# Patient Record
Sex: FEMALE | Employment: UNEMPLOYED | URBAN - METROPOLITAN AREA
[De-identification: names, ages, dates, MRNs, and addresses within clinical notes are randomized per-mention and may not be internally consistent; named-entity substitution may affect disease eponyms.]

---

## 2017-06-20 DIAGNOSIS — Z30.41 ENCOUNTER FOR SURVEILLANCE OF CONTRACEPTIVE PILLS: ICD-10-CM

## 2017-06-20 NOTE — TELEPHONE ENCOUNTER
levonorgestrel-ethinyl estradiol (NORDETTE) 0.15-30 MG-MCG       Last Written Prescription Date: 7/22/16  Last Fill Quantity: 90,  # refills: 3   Last Office Visit with G, P /or [facility] prescribing provider: 7/22/16

## 2017-06-21 RX ORDER — LEVONORGESTREL AND ETHINYL ESTRADIOL 0.15-0.03
KIT ORAL
Qty: 28 TABLET | Refills: 0 | Status: SHIPPED | OUTPATIENT
Start: 2017-06-21 | End: 2017-06-30

## 2017-06-21 NOTE — TELEPHONE ENCOUNTER
TC-  Please call pt to schedule yearly exam.  Pt overdue.    Medication is being filled for 1 time refill only due to:  Patient needs to be seen because it has been more than one year since last visit.     Jenae Stevens, RN  Triage Nurse

## 2017-06-22 NOTE — TELEPHONE ENCOUNTER
Patient given the message to schedule appointment, she will call back to schedule.  -Ana Cristina Cain

## 2017-06-30 ENCOUNTER — OFFICE VISIT (OUTPATIENT)
Dept: FAMILY MEDICINE | Facility: CLINIC | Age: 32
End: 2017-06-30
Payer: COMMERCIAL

## 2017-06-30 VITALS
OXYGEN SATURATION: 98 % | SYSTOLIC BLOOD PRESSURE: 118 MMHG | TEMPERATURE: 99 F | HEART RATE: 94 BPM | WEIGHT: 142 LBS | BODY MASS INDEX: 25.16 KG/M2 | HEIGHT: 63 IN | DIASTOLIC BLOOD PRESSURE: 78 MMHG

## 2017-06-30 DIAGNOSIS — Z30.41 ENCOUNTER FOR SURVEILLANCE OF CONTRACEPTIVE PILLS: ICD-10-CM

## 2017-06-30 DIAGNOSIS — Z00.00 ENCOUNTER FOR ROUTINE ADULT HEALTH EXAMINATION WITHOUT ABNORMAL FINDINGS: Primary | ICD-10-CM

## 2017-06-30 LAB
ALBUMIN UR-MCNC: NEGATIVE MG/DL
APPEARANCE UR: CLEAR
BILIRUB UR QL STRIP: NEGATIVE
COLOR UR AUTO: YELLOW
GLUCOSE UR STRIP-MCNC: NEGATIVE MG/DL
HGB UR QL STRIP: ABNORMAL
KETONES UR STRIP-MCNC: NEGATIVE MG/DL
LEUKOCYTE ESTERASE UR QL STRIP: NEGATIVE
NITRATE UR QL: NEGATIVE
NON-SQ EPI CELLS #/AREA URNS LPF: NORMAL /LPF
PH UR STRIP: 6 PH (ref 5–7)
RBC #/AREA URNS AUTO: NORMAL /HPF (ref 0–2)
SP GR UR STRIP: 1.01 (ref 1–1.03)
URN SPEC COLLECT METH UR: ABNORMAL
UROBILINOGEN UR STRIP-ACNC: 0.2 EU/DL (ref 0.2–1)
WBC #/AREA URNS AUTO: NORMAL /HPF (ref 0–2)

## 2017-06-30 PROCEDURE — 81001 URINALYSIS AUTO W/SCOPE: CPT | Performed by: PHYSICIAN ASSISTANT

## 2017-06-30 PROCEDURE — 99395 PREV VISIT EST AGE 18-39: CPT | Performed by: PHYSICIAN ASSISTANT

## 2017-06-30 RX ORDER — LEVONORGESTREL AND ETHINYL ESTRADIOL 0.15-0.03
1 KIT ORAL DAILY
Qty: 90 TABLET | Refills: 3 | Status: SHIPPED | OUTPATIENT
Start: 2017-06-30 | End: 2018-05-07

## 2017-06-30 NOTE — NURSING NOTE
"Chief Complaint   Patient presents with     Physical       Initial /78 (BP Location: Right arm, Patient Position: Right side, Cuff Size: Adult Regular)  Pulse 94  Temp 99  F (37.2  C) (Tympanic)  Ht 5' 2.5\" (1.588 m)  Wt 142 lb (64.4 kg)  LMP 05/06/2017 (Approximate)  SpO2 98%  BMI 25.56 kg/m2 Estimated body mass index is 25.56 kg/(m^2) as calculated from the following:    Height as of this encounter: 5' 2.5\" (1.588 m).    Weight as of this encounter: 142 lb (64.4 kg).  Medication Reconciliation: complete  "

## 2017-06-30 NOTE — PROGRESS NOTES
SUBJECTIVE:   CC: Keara Ayon is an 32 year old woman who presents for preventive health visit.     Physical   Annual:     Getting at least 3 servings of Calcium per day::  Yes    Bi-annual eye exam::  NO    Dental care twice a year::  Yes    Sleep apnea or symptoms of sleep apnea::  None    Diet::  Regular (no restrictions)    Taking medications regularly::  Yes    Medication side effects::  None    Additional concerns today::  YES (review new bc rx and multi vitamin )    Would like ok to take MTV  Also got generic of OCP, wonders if okay?      Today's PHQ-2 Score:   PHQ-2 ( 1999 Pfizer) 6/30/2017   Q1: Little interest or pleasure in doing things 0   Q2: Feeling down, depressed or hopeless 0   PHQ-2 Score 0   Q1: Little interest or pleasure in doing things Not at all   Q2: Feeling down, depressed or hopeless Not at all   PHQ-2 Score 0       Abuse: Current or Past(Physical, Sexual or Emotional)- No  Do you feel safe in your environment - Yes    Social History   Substance Use Topics     Smoking status: Never Smoker     Smokeless tobacco: Never Used     Alcohol use No     The patient does not drink >3 drinks per day nor >7 drinks per week.    Reviewed orders with patient.  Reviewed health maintenance and updated orders accordingly - Yes  BP Readings from Last 3 Encounters:   06/30/17 118/78   07/22/16 130/60   06/03/14 102/58    Wt Readings from Last 3 Encounters:   06/30/17 142 lb (64.4 kg)   07/22/16 144 lb 4 oz (65.4 kg)   06/03/14 140 lb (63.5 kg)                  Patient Active Problem List   Diagnosis     Acute reaction to stress     CARDIOVASCULAR SCREENING; LDL GOAL LESS THAN 160     Postpartum depression     H/O hyperthyroidism     Overweight     Past Surgical History:   Procedure Laterality Date     NO HISTORY OF SURGERY         Social History   Substance Use Topics     Smoking status: Never Smoker     Smokeless tobacco: Never Used     Alcohol use No     Family History   Problem Relation Age of Onset      DIABETES Mother      Cardiovascular Mother      unknown      Thyroid Disease Mother      DIABETES Father      Family History Negative Sister      Family History Negative Brother      Family History Negative Son      Family History Negative Son      Family History Negative Daughter      Eye Disorder Paternal Grandmother          Current Outpatient Prescriptions   Medication Sig Dispense Refill     levonorgestrel-ethinyl estradiol (LEVORA 0.15/30, 28,) 0.15-30 MG-MCG per tablet Take 1 tablet by mouth daily for 90 doses 90 tablet 3     cholecalciferol (VITAMIN D) 1000 UNIT tablet Take 1 tablet (1,000 Units) by mouth daily Unsure of dose 100 tablet 3     [DISCONTINUED] LEVORA 0.15/30, 28, 0.15-30 MG-MCG per tablet TAKE 1 TABLET BY MOUTH DAILY 28 tablet 0       Mammogram not appropriate for this patient based on age.    Pertinent mammograms are reviewed under the imaging tab.  History of abnormal Pap smear: NO - age 30-65 PAP every 5 years with negative HPV co-testing recommended    Reviewed and updated as needed this visit by clinical staff  Tobacco  Allergies  Med Hx  Surg Hx  Fam Hx  Soc Hx        Reviewed and updated as needed this visit by Provider        Past Medical History:   Diagnosis Date     H/O hyperthyroidism 4/16/2013    post partum       Past Surgical History:   Procedure Laterality Date     NO HISTORY OF SURGERY         ROS:  C: NEGATIVE for fever, chills, change in weight  I: NEGATIVE for worrisome rashes, moles or lesions  E: NEGATIVE for vision changes or irritation  ENT: NEGATIVE for ear, mouth and throat problems  R: NEGATIVE for significant cough or SOB  B: NEGATIVE for masses, tenderness or discharge  CV: NEGATIVE for chest pain, palpitations or peripheral edema  GI: NEGATIVE for nausea, abdominal pain, heartburn, or change in bowel habits  : NEGATIVE for unusual urinary or vaginal symptoms. Periods are regular.  M: NEGATIVE for significant arthralgias or myalgia  N: NEGATIVE for  "weakness, dizziness or paresthesias  P: NEGATIVE for changes in mood or affect     OBJECTIVE:   /78 (BP Location: Right arm, Patient Position: Right side, Cuff Size: Adult Regular)  Pulse 94  Temp 99  F (37.2  C) (Tympanic)  Ht 5' 2.5\" (1.588 m)  Wt 142 lb (64.4 kg)  LMP 05/06/2017 (Approximate)  SpO2 98%  BMI 25.56 kg/m2  EXAM:  GENERAL: healthy, alert and no distress  EYES: Eyes grossly normal to inspection, PERRL and conjunctivae and sclerae normal  HENT: ear canals and TM's normal, nose and mouth without ulcers or lesions  NECK: no adenopathy, no asymmetry, masses, or scars and thyroid normal to palpation  RESP: lungs clear to auscultation - no rales, rhonchi or wheezes  BREAST: normal without masses, tenderness or nipple discharge and no palpable axillary masses or adenopathy  CV: regular rate and rhythm, normal S1 S2, no S3 or S4, no murmur, click or rub, no peripheral edema and peripheral pulses strong  ABDOMEN: soft, nontender, no hepatosplenomegaly, no masses and bowel sounds normal  : deferred  MS: no gross musculoskeletal defects noted, no edema  SKIN: no suspicious lesions or rashes  NEURO: Normal strength and tone, mentation intact and speech normal  PSYCH: mentation appears normal, affect normal/bright    ASSESSMENT/PLAN:   1. Encounter for routine adult health examination without abnormal findings  Pleasant 33 y/o  Labs checked last year normal. Will check next year.  Offered Tdap, she declined.  - *UA reflex to Microscopic and Culture ([place] [facility] (except [place] and [place])  - Urine Microscopic    2. Encounter for surveillance of contraceptive pills  Chronic medication, tolerating well without s/e, refilled for 1 year.  - levonorgestrel-ethinyl estradiol (LEVORA 0.15/30, 28,) 0.15-30 MG-MCG per tablet; Take 1 tablet by mouth daily for 90 doses  Dispense: 90 tablet; Refill: 3    COUNSELING:  Reviewed preventive health counseling, as reflected in patient " "instructions         reports that she has never smoked. She has never used smokeless tobacco.    Estimated body mass index is 25.56 kg/(m^2) as calculated from the following:    Height as of this encounter: 5' 2.5\" (1.588 m).    Weight as of this encounter: 142 lb (64.4 kg).       Counseling Resources:  ATP IV Guidelines  Pooled Cohorts Equation Calculator  Breast Cancer Risk Calculator  FRAX Risk Assessment  ICSI Preventive Guidelines  Dietary Guidelines for Americans, 2010  USDA's MyPlate  ASA Prophylaxis  Lung CA Screening    Rosalina Ellis PA-C  [facility]  "

## 2017-06-30 NOTE — MR AVS SNAPSHOT
"              After Visit Summary   2017    Keara Ayon    MRN: [id number]           Patient Information     Date Of Birth          1985        Visit Information        Provider Department      2017 3:10 PM Rosalina Ellis PA-C [facility]        Today's Diagnoses     Encounter for routine adult health examination without abnormal findings    -  1    Encounter for surveillance of contraceptive pills           Follow-ups after your visit        Who to contact     If you have questions or need follow up information about today's clinic visit or your schedule please contact [facility] directly at [phone].  Normal or non-critical lab and imaging results will be communicated to you by MyChart, letter or phone within 4 business days after the clinic has received the results. If you do not hear from us within 7 days, please contact the clinic through KeyVivehart or phone. If you have a critical or abnormal lab result, we will notify you by phone as soon as possible.  Submit refill requests through [employer] or call your pharmacy and they will forward the refill request to us. Please allow 3 business days for your refill to be completed.          Additional Information About Your Visit        MyChart Information     [employer] lets you send messages to your doctor, view your test results, renew your prescriptions, schedule appointments and more. To sign up, go to [email or web address] . Click on \"Log in\" on the left side of the screen, which will take you to the Welcome page. Then click on \"Sign up Now\" on the right side of the page.     You will be asked to enter the access code listed below, as well as some personal information. Please follow the directions to create your username and password.     Your access code is: [email or web address]  Expires: 2017  3:25 PM     Your access code will  in 90 days. If you need help or a new code, please call your [place] " "clinic or [phone].        Care EveryWhere ID     This is your Care EveryWhere ID. This could be used by other organizations to access your [place] medical records  [id number]        Your Vitals Were     Pulse Temperature Height Last Period Pulse Oximetry BMI (Body Mass Index)    94 99  F (37.2  C) (Tympanic) 5' 2.5\" (1.588 m) 05/06/2017 (Approximate) 98% 25.56 kg/m2       Blood Pressure from Last 3 Encounters:   06/30/17 118/78   07/22/16 130/60   06/03/14 102/58    Weight from Last 3 Encounters:   06/30/17 142 lb (64.4 kg)   07/22/16 144 lb 4 oz (65.4 kg)   06/03/14 140 lb (63.5 kg)              We Performed the Following     *UA reflex to Microscopic and Culture ([place] and [facility] (except [place] and [place])     Urine Microscopic          Today's Medication Changes          These changes are accurate as of: 6/30/17  3:25 PM.  If you have any questions, ask your nurse or doctor.               These medicines have changed or have updated prescriptions.        Dose/Directions    levonorgestrel-ethinyl estradiol 0.15-30 MG-MCG per tablet   Commonly known as:  LEVORA 0.15/30 (28)   This may have changed:  See the new instructions.   Used for:  Encounter for surveillance of contraceptive pills   Changed by:  Rosalina Ellis PA-C        Dose:  1 tablet   Take 1 tablet by mouth daily for 90 doses   Quantity:  90 tablet   Refills:  3            Where to get your medicines      These medications were sent to [facility] [address] [facility] [address] [facility] AT [facility] [address] [facility]  [address] [facility] [address]     Phone:  [phone]     levonorgestrel-ethinyl estradiol 0.15-30 MG-MCG per tablet                Primary Care Provider Office Phone # Fax #    Blanca Lyn [phone]       [facility] [address]  [facility] [address]        Equal Access to Services     MAHESH PULLIAM AH: Sebastián Anthony, " katrin turner, solange kachristopher dowell, rocky jessin hayaan randyjenn norbetrroyce laRonaldaajesica ah. So [facility] [phone].    ATENCIÓN: Si williamla jessica, tiene a whalen disposición servicios gratuitos de asistencia lingüística. Anthony al [phone].    We comply with applicable federal civil rights laws and [place] laws. We do not discriminate on the basis of race, color, national origin, age, disability sex, sexual orientation or gender identity.            Thank you!     Thank you for choosing [facility]  for your care. Our goal is always to provide you with excellent care. Hearing back from our patients is one way we can continue to improve our services. Please take a few minutes to complete the written survey that you may receive in the mail after your visit with us. Thank you!             Your Updated Medication List - Protect others around you: Learn how to safely use, store and throw away your medicines at www.disposemymeds.org.          This list is accurate as of: 6/30/17  3:25 PM.  Always use your most recent med list.                   Brand Name Dispense Instructions for use Diagnosis    cholecalciferol 1000 UNIT tablet    vitamin D    100 tablet    Take 1 tablet (1,000 Units) by mouth daily Unsure of dose        levonorgestrel-ethinyl estradiol 0.15-30 MG-MCG per tablet    LEVORA 0.15/30 (28)    90 tablet    Take 1 tablet by mouth daily for 90 doses    Encounter for surveillance of contraceptive pills

## 2017-07-17 DIAGNOSIS — Z30.41 ENCOUNTER FOR SURVEILLANCE OF CONTRACEPTIVE PILLS: ICD-10-CM

## 2017-07-17 NOTE — TELEPHONE ENCOUNTER
levonorgestrel-ethinyl estradiol (LEVORA 0.15/30, 28,) 0.15-30 MG-MCG      Last Written Prescription Date: 6/30/17  Last Fill Quantity: 90,  # refills: 3   Last Office Visit with FMMALIA, P or [facility] prescribing provider: 6/30/17

## 2017-07-19 RX ORDER — LEVONORGESTREL AND ETHINYL ESTRADIOL 0.15-0.03
KIT ORAL
Qty: 28 TABLET | Refills: 0 | OUTPATIENT
Start: 2017-07-19

## 2018-05-07 ENCOUNTER — OFFICE VISIT (OUTPATIENT)
Dept: FAMILY MEDICINE | Facility: CLINIC | Age: 33
End: 2018-05-07
Payer: COMMERCIAL

## 2018-05-07 VITALS
BODY MASS INDEX: 25.48 KG/M2 | TEMPERATURE: 97.9 F | DIASTOLIC BLOOD PRESSURE: 74 MMHG | WEIGHT: 143.8 LBS | HEART RATE: 74 BPM | SYSTOLIC BLOOD PRESSURE: 120 MMHG | OXYGEN SATURATION: 100 % | HEIGHT: 63 IN

## 2018-05-07 DIAGNOSIS — Z13.6 CARDIOVASCULAR SCREENING; LDL GOAL LESS THAN 160: ICD-10-CM

## 2018-05-07 DIAGNOSIS — Z86.39 H/O HYPERTHYROIDISM: ICD-10-CM

## 2018-05-07 DIAGNOSIS — Z30.41 ENCOUNTER FOR SURVEILLANCE OF CONTRACEPTIVE PILLS: ICD-10-CM

## 2018-05-07 DIAGNOSIS — Z86.32 HISTORY OF GESTATIONAL DIABETES: Primary | ICD-10-CM

## 2018-05-07 DIAGNOSIS — Z83.3 FAMILY HISTORY OF DIABETES MELLITUS: ICD-10-CM

## 2018-05-07 DIAGNOSIS — H57.89 EYE LUMP: ICD-10-CM

## 2018-05-07 DIAGNOSIS — Z00.00 ENCOUNTER FOR ROUTINE ADULT HEALTH EXAMINATION WITHOUT ABNORMAL FINDINGS: ICD-10-CM

## 2018-05-07 PROCEDURE — 99395 PREV VISIT EST AGE 18-39: CPT | Performed by: FAMILY MEDICINE

## 2018-05-07 RX ORDER — LEVONORGESTREL AND ETHINYL ESTRADIOL 0.15-0.03
1 KIT ORAL DAILY
Qty: 90 TABLET | Refills: 3 | Status: SHIPPED | OUTPATIENT
Start: 2018-05-07 | End: 2020-07-21

## 2018-05-07 ASSESSMENT — ENCOUNTER SYMPTOMS
CONSTIPATION: 0
CHILLS: 0
DIZZINESS: 0
HEMATURIA: 0
FREQUENCY: 0
EYE PAIN: 0
DIARRHEA: 0
FEVER: 0
COUGH: 0
HEMATOCHEZIA: 0
NERVOUS/ANXIOUS: 0
ABDOMINAL PAIN: 0

## 2018-05-07 NOTE — MR AVS SNAPSHOT
After Visit Summary   5/7/2018    Keara Ayon    MRN: [id number]           Patient Information     Date Of Birth          1985        Visit Information        Provider Department      5/7/2018 10:50 AM [facility]        Today's Diagnoses     History of gestational diabetes    -  1    Family history of diabetes mellitus        Encounter for surveillance of contraceptive pills        CARDIOVASCULAR SCREENING; LDL GOAL LESS THAN 160          Care Instructions      Preventive Health Recommendations  Female Ages 26 - 39  Yearly exam:   See your health care provider every year in order to    Review health changes.     Discuss preventive care.      Review your medicines if you your doctor has prescribed any.    Until age 30: Get a Pap test every three years (more often if you have had an abnormal result).    After age 30: Talk to your doctor about whether you should have a Pap test every 3 years or have a Pap test with HPV screening every 5 years.   You do not need a Pap test if your uterus was removed (hysterectomy) and you have not had cancer.  You should be tested each year for STDs (sexually transmitted diseases), if you're at risk.   Talk to your provider about how often to have your cholesterol checked.  If you are at risk for diabetes, you should have a diabetes test (fasting glucose).  Shots: Get a flu shot each year. Get a tetanus shot every 10 years.   Nutrition:     Eat at least 5 servings of fruits and vegetables each day.    Eat whole-grain bread, whole-wheat pasta and brown rice instead of white grains and rice.    Talk to your provider about Calcium and Vitamin D.     Lifestyle    Exercise at least 150 minutes a week (30 minutes a day, 5 days of the week). This will help you control your weight and prevent disease.    Limit alcohol to one drink per day.    No smoking.     Wear sunscreen to prevent skin cancer.    See your dentist every six months for an  exam and cleaning.      --------------------------------------------    You can schedule a fasting lab visit at your convenience.              Follow-ups after your visit        Follow-up notes from your care team     Return in about 1 year (around 5/7/2019) for Physical Exam.      Future tests that were ordered for you today     Open Future Orders        Priority Expected Expires Ordered    Lipid panel reflex to direct LDL Fasting Routine 5/7/2018 5/7/2019 5/7/2018    **A1C FUTURE anytime Routine 5/7/2018 5/7/2019 5/7/2018    **Glucose FUTURE anytime Routine 5/7/2018 5/7/2019 5/7/2018            Who to contact     If you have questions or need follow up information about today's clinic visit or your schedule please contact [facility] directly at [phone].  Normal or non-critical lab and imaging results will be communicated to you by Edge[employer]hart, letter or phone within 4 business days after the clinic has received the results. If you do not hear from us within 7 days, please contact the clinic through [employer] or phone. If you have a critical or abnormal lab result, we will notify you by phone as soon as possible.  Submit refill requests through [employer] or call your pharmacy and they will forward the refill request to us. Please allow 3 business days for your refill to be completed.          Additional Information About Your Visit        Edgeiohart Information     [employer] gives you secure access to your electronic health record. If you see a primary care provider, you can also send messages to your care team and make appointments. If you have questions, please call your primary care clinic.  If you do not have a primary care provider, please call [phone] and they will assist you.        Care EveryWhere ID     This is your Care EveryWhere ID. This could be used by other organizations to access your [place] medical records  [id number]        Your Vitals Were     Pulse Temperature Height Pulse  "Oximetry BMI (Body Mass Index)       74 97.9  F (36.6  C) (Oral) 5' 2.5\" (1.588 m) 100% 25.88 kg/m2        Blood Pressure from Last 3 Encounters:   05/07/18 120/74   06/30/17 118/78   07/22/16 130/60    Weight from Last 3 Encounters:   05/07/18 143 lb 12.8 oz (65.2 kg)   06/30/17 142 lb (64.4 kg)   07/22/16 144 lb 4 oz (65.4 kg)                 Where to get your medicines      These medications were sent to [facility] [address] [place], [place] - [address] [facility] [place] [address] & [facility]  [address] [facility] [address]     Phone:  [phone]     levonorgestrel-ethinyl estradiol 0.15-30 MG-MCG per tablet          Primary Care Provider Office Phone # Fax #    Blanca Lyn [phone] [address]  [facility] [address]        Equal Access to Services     [facility]: Hadii khushbu ku hadasho Sochanelali, waaxda luqadaha, qaybta kaalmada magalys, rocky herron. So [facility] [phone].    ATENCIÓN: Si habla español, tiene a whalen disposición servicios gratuitos de asistencia lingüística. [facility] [phone].    We comply with applicable federal civil rights laws and [place] laws. We do not discriminate on the basis of race, color, national origin, age, disability, sex, sexual orientation, or gender identity.            Thank you!     Thank you for choosing [facility]  for your care. Our goal is always to provide you with excellent care. Hearing back from our patients is one way we can continue to improve our services. Please take a few minutes to complete the written survey that you may receive in the mail after your visit with us. Thank you!             Your Updated Medication List - Protect others around you: Learn how to safely use, store and throw away your medicines at www.disposemymeds.org.          This list is accurate as of 5/7/18 11:24 AM.  Always use your most recent med list.                   Brand Name " Dispense Instructions for use Diagnosis    cholecalciferol 1000 UNIT tablet    vitamin D3    100 tablet    Take 1 tablet (1,000 Units) by mouth daily Unsure of dose        levonorgestrel-ethinyl estradiol 0.15-30 MG-MCG per tablet    LEVORA 0.15/30 (28)    90 tablet    Take 1 tablet by mouth daily    Encounter for surveillance of contraceptive pills

## 2018-05-07 NOTE — PATIENT INSTRUCTIONS

## 2018-05-07 NOTE — PROGRESS NOTES
SUBJECTIVE:   CC: Keara Ayon is an 33 year old woman who presents for preventive health visit.     Physical   Annual:     Getting at least 3 servings of Calcium per day::  NO    Bi-annual eye exam::  NO    Dental care twice a year::  Yes    Sleep apnea or symptoms of sleep apnea::  None    Diet::  Regular (no restrictions)    Taking medications regularly::  Yes    Medication side effects::  None    Additional concerns today::  No                    Today's PHQ-2 Score:   PHQ-2 ( 1999 Pfizer) 6/30/2017   Q1: Little interest or pleasure in doing things 0   Q2: Feeling down, depressed or hopeless 0   PHQ-2 Score 0   Q1: Little interest or pleasure in doing things Not at all   Q2: Feeling down, depressed or hopeless Not at all   PHQ-2 Score 0       Abuse: Current or Past(Physical, Sexual or Emotional)- No  Do you feel safe in your environment - Yes    Social History   Substance Use Topics     Smoking status: Never Smoker     Smokeless tobacco: Never Used     Alcohol use No     No flowsheet data found.    Reviewed orders with patient.  Reviewed health maintenance and updated orders accordingly - Yes      Mammogram not appropriate for this patient based on age.    Pertinent mammograms are reviewed under the imaging tab.  History of abnormal Pap smear: NO - age 30- 65 PAP every 3 years recommended  NO - age 30-65 PAP every 5 years with negative HPV co-testing recommended    Reviewed and updated as needed this visit by clinical staff         Reviewed and updated as needed this visit by Provider        Patient Active Problem List   Diagnosis     Acute reaction to stress     CARDIOVASCULAR SCREENING; LDL GOAL LESS THAN 160     Postpartum depression     H/O hyperthyroidism     Overweight     History of gestational diabetes       Past Medical History:   Diagnosis Date     H/O hyperthyroidism 4/16/2013    post partum        Past Surgical History:   Procedure Laterality Date     NO HISTORY OF SURGERY         Obstetric  History       T3      L3     SAB0   TAB0   Ectopic0   Multiple0   Live Births0       # Outcome Date GA Lbr Amos/2nd Weight Sex Delivery Anes PTL Lv   3 Term            2 Term            1 Term                   Current Outpatient Prescriptions   Medication Sig Dispense Refill     cholecalciferol (VITAMIN D) 1000 UNIT tablet Take 1 tablet (1,000 Units) by mouth daily Unsure of dose 100 tablet 3     levonorgestrel-ethinyl estradiol (LEVORA 0.15/30, 28,) 0.15-30 MG-MCG per tablet Take 1 tablet by mouth daily 90 tablet 3     [DISCONTINUED] levonorgestrel-ethinyl estradiol (LEVORA 0.15/30, 28,) 0.15-30 MG-MCG per tablet Take 1 tablet by mouth daily for 90 doses 90 tablet 3       Family History   Problem Relation Age of Onset     DIABETES Mother      Cardiovascular Mother      unknown      Thyroid Disease Mother      DIABETES Father      Family History Negative Sister      Family History Negative Brother      Family History Negative Son      Family History Negative Son      Family History Negative Daughter      Eye Disorder Paternal Grandmother        Social History   Substance Use Topics     Smoking status: Never Smoker     Smokeless tobacco: Never Used     Alcohol use No       Immunization History   Administered Date(s) Administered     Influenza (IIV3) PF 2006     TD (ADULT, 7+) 2004       Review of Systems   Constitutional: Negative for chills and fever.   HENT: Negative for congestion and ear pain.    Eyes: Negative for pain.   Respiratory: Negative for cough.    Cardiovascular: Negative for chest pain.   Gastrointestinal: Negative for abdominal pain, constipation, diarrhea and hematochezia.   Genitourinary: Negative for frequency, genital sores and hematuria.   Neurological: Negative for dizziness.   Psychiatric/Behavioral: The patient is not nervous/anxious.      Active in the home.    Small white ball in eyes. Left > right. In the last month. Not bothersome.          OBJECTIVE:   /74  "(BP Location: Right arm, Cuff Size: Adult Regular)  Pulse 74  Temp 97.9  F (36.6  C) (Oral)  Ht 5' 2.5\" (1.588 m)  Wt 143 lb 12.8 oz (65.2 kg)  SpO2 100%  BMI 25.88 kg/m2  Physical Exam  GENERAL: healthy, alert and no distress  EYES: Eyes grossly normal to inspection, PERRL and conjunctivae and sclerae normal x there is a small white lump, ~ 1 mm diameter, though left was more prominent, outer corner lower lids bilaterally, conjunctival surface.  HENT: ear canals and TM's normal, nose and mouth without ulcers or lesions  NECK: no adenopathy, no asymmetry, masses, or scars and thyroid normal to palpation  RESP: lungs clear to auscultation - no rales, rhonchi or wheezes  BREAST: normal without masses, tenderness or nipple discharge and no palpable axillary masses or adenopathy  CV: regular rates and rhythm, peripheral pulses strong and no peripheral edema  ABDOMEN: soft, nontender, no hepatosplenomegaly, no masses and bowel sounds normal  MS: no gross musculoskeletal defects noted, no edema  SKIN: no suspicious lesions or rashes  NEURO: Normal strength and tone, mentation intact and speech normal  PSYCH: mentation appears normal, affect normal/bright    TSH   Date Value Ref Range Status   07/25/2016 1.26 0.40 - 4.00 mU/L Final   ]    Lab Results   Component Value Date    A1C 5.7 10/23/2013       Recent Labs   Lab Test  07/25/16   0911  06/03/14   1452  07/08/11   0950   CHOL  149  146  128   HDL  49*  42*  37*   LDL  84  78  79   TRIG  81  133  61   CHOLHDLRATIO   --   3.5  3.5       ASSESSMENT/PLAN:     Encounter for routine adult health examination without abnormal findings  defers immunization, including Tdap.  Defers HIV screen.    Eye lump  Suspect a clogged gland of some sort.  Discussed heat.   If bothersome, do recommend eye doctor.     History of gestational diabetes  Discussed she is at higher risk for DM; reviewed her last HgbA1C that did confirm prediabetes.   She anticipates returning in the future. " "  - **A1C FUTURE anytime; Future  - **Glucose FUTURE anytime; Future    Family history of diabetes mellitus  As above.   - **A1C FUTURE anytime; Future  - **Glucose FUTURE anytime; Future    Encounter for surveillance of contraceptive pills  Refilling.   - levonorgestrel-ethinyl estradiol (LEVORA 0.15/30, 28,) 0.15-30 MG-MCG per tablet; Take 1 tablet by mouth daily    H/O hyperthyroidism  Discussed consideration of TSH testing; she defers.  Did discuss symptoms of both hyper and hypo thyroidism; recommend low threshhold for testing.     CARDIOVASCULAR SCREENING; LDL GOAL LESS THAN 160  She will return when fasting.   - Lipid panel reflex to direct LDL Fasting; Future      COUNSELING:  Reviewed preventive health counseling, as reflected in patient instructions       Regular exercise       Healthy diet/nutrition       Vision screening    BP Screening:   Last 3 BP Readings:    BP Readings from Last 3 Encounters:   05/07/18 120/74   06/30/17 118/78   07/22/16 130/60       The following was recommended to the patient:  Re-screen BP within a year and recommended lifestyle modifications     reports that she has never smoked. She has never used smokeless tobacco.    Estimated body mass index is 25.88 kg/(m^2) as calculated from the following:    Height as of this encounter: 5' 2.5\" (1.588 m).    Weight as of this encounter: 143 lb 12.8 oz (65.2 kg).   Weight management plan: Discussed healthy diet and exercise guidelines and patient will follow up in 12 months in clinic to re-evaluate.    Counseling Resources:  ATP IV Guidelines  Pooled Cohorts Equation Calculator  Breast Cancer Risk Calculator  FRAX Risk Assessment  ICSI Preventive Guidelines  Dietary Guidelines for Americans, 2010  USDA's MyPlate  ASA Prophylaxis  Lung CA Screening    Blanca Lyn MD, MD  [facility], [place]  Answers for HPI/ROS submitted by the patient on 5/7/2018   PHQ-2 Score: 0    "

## 2018-06-22 DIAGNOSIS — Z30.41 ENCOUNTER FOR SURVEILLANCE OF CONTRACEPTIVE PILLS: ICD-10-CM

## 2018-06-22 RX ORDER — LEVONORGESTREL AND ETHINYL ESTRADIOL 0.15-0.03
KIT ORAL
Qty: 84 TABLET | Refills: 0 | OUTPATIENT
Start: 2018-06-22

## 2018-06-22 NOTE — TELEPHONE ENCOUNTER
"Duplicate?    Requested Prescriptions   Pending Prescriptions Disp Refills     ALTAVERA 0.15-30 MG-MCG per tablet [Pharmacy Med Name: ALTAVERA TABLETS 28S]  Last Written Prescription Date:  5/7/18  Last Fill Quantity: 90,  # refills: 3   Last office visit: 5/7/2018 with prescribing provider:  5/7/2018     Future Office Visit:     84 tablet 0     Sig: TAKE 1 TABLET BY MOUTH DAILY FOR 90 DOSES    Contraceptives Protocol Passed    6/22/2018  3:34 AM       Passed - Patient is not a current smoker if age is 35 or older       Passed - Recent (12 mo) or future (30 days) visit within the authorizing provider's specialty    Patient had office visit in the last 12 months or has a visit in the next 30 days with authorizing provider or within the authorizing provider's specialty.  See \"Patient Info\" tab in inbasket, or \"Choose Columns\" in Meds & Orders section of the refill encounter.           Passed - No active pregnancy on record       Passed - No positive pregnancy test in past 12 months          "

## 2019-11-07 ENCOUNTER — HEALTH MAINTENANCE LETTER (OUTPATIENT)
Age: 34
End: 2019-11-07

## 2020-06-21 DIAGNOSIS — Z30.41 ENCOUNTER FOR SURVEILLANCE OF CONTRACEPTIVE PILLS: ICD-10-CM

## 2020-06-23 NOTE — TELEPHONE ENCOUNTER
levonorgestrel-ethinyl estradiol (LEVORA 0.15/30, 28,) 0.15-30 MG-MCG per tablet   Last Written Prescription Date:  5/7/18  Last Fill Quantity: 90,   # refills: 3  Last Office Visit: 5/7/18  Future Office visit:    Next 5 appointments (look out 90 days)    Aug 06, 2020 10:10 AM CDT  PHYSICAL with lBanca Lyn MD  [facility] ([address] [address] [phone]           Routing refill request to provider for review/approval because:  Patient needs appointment to fill has been 2 years since last appt.       AALIYAH-     Imelda Gonzales RN on 6/23/2020 at 8:36 AM

## 2020-06-29 RX ORDER — LEVONORGESTREL AND ETHINYL ESTRADIOL 0.15-0.03
KIT ORAL
Qty: 84 TABLET | OUTPATIENT
Start: 2020-06-29

## 2020-06-29 RX ORDER — LEVONORGESTREL AND ETHINYL ESTRADIOL 0.1-0.02MG
KIT ORAL
Qty: 84 TABLET | Refills: 1 | Status: SHIPPED | OUTPATIENT
Start: 2020-06-29 | End: 2020-07-21 | Stop reason: ALTCHOICE

## 2020-06-29 NOTE — TELEPHONE ENCOUNTER
Routing refill request to provider for review/approval because:  Been > 2 years since visit.   Next 5 appointments (look out 90 days)    Aug 06, 2020 10:10 AM CDT  PHYSICAL with Blanca Lyn MD  [facility] [address] [place] [id number] [phone]        Vero VENEGAS RN

## 2020-06-29 NOTE — TELEPHONE ENCOUNTER
Please clarify which she is taking.  Then send appropriate ocp pill x3 months so that she has enough to get to her physical with  at the beginning of August.  CASTRO.

## 2020-06-29 NOTE — TELEPHONE ENCOUNTER
Called patient and informed patient that she would need an appointment for further refills since it has been 2 years. Patient stated she will call her  and call us back.     Imelda Gonzales RN on 6/29/2020 at 3:04 PM

## 2020-06-29 NOTE — TELEPHONE ENCOUNTER
Patient calling back to check the status, states she only has a couple of days left. Would like a call at [phone] when it has been sent to the pharmacy.    Deborah Kidd,

## 2020-07-21 ENCOUNTER — OFFICE VISIT (OUTPATIENT)
Dept: FAMILY MEDICINE | Facility: CLINIC | Age: 35
End: 2020-07-21
Payer: COMMERCIAL

## 2020-07-21 VITALS
DIASTOLIC BLOOD PRESSURE: 80 MMHG | WEIGHT: 149.6 LBS | OXYGEN SATURATION: 99 % | TEMPERATURE: 99.1 F | HEIGHT: 63 IN | HEART RATE: 94 BPM | SYSTOLIC BLOOD PRESSURE: 110 MMHG | BODY MASS INDEX: 26.51 KG/M2

## 2020-07-21 DIAGNOSIS — Z30.41 ENCOUNTER FOR SURVEILLANCE OF CONTRACEPTIVE PILLS: ICD-10-CM

## 2020-07-21 DIAGNOSIS — Z00.00 ROUTINE HISTORY AND PHYSICAL EXAMINATION OF ADULT: Primary | ICD-10-CM

## 2020-07-21 DIAGNOSIS — N64.4 BREAST PAIN, RIGHT: ICD-10-CM

## 2020-07-21 DIAGNOSIS — L30.9 DERMATITIS: ICD-10-CM

## 2020-07-21 PROCEDURE — 99213 OFFICE O/P EST LOW 20 MIN: CPT | Mod: 25 | Performed by: FAMILY MEDICINE

## 2020-07-21 PROCEDURE — 99395 PREV VISIT EST AGE 18-39: CPT | Performed by: FAMILY MEDICINE

## 2020-07-21 RX ORDER — LEVONORGESTREL AND ETHINYL ESTRADIOL 0.15-0.03
1 KIT ORAL DAILY
Qty: 84 TABLET | Refills: 3 | Status: SHIPPED | OUTPATIENT
Start: 2020-07-21 | End: 2021-08-17

## 2020-07-21 RX ORDER — MULTIVITAMIN,THERAPEUTIC
1 TABLET ORAL DAILY
COMMUNITY

## 2020-07-21 ASSESSMENT — ENCOUNTER SYMPTOMS
FEVER: 0
FREQUENCY: 0
NERVOUS/ANXIOUS: 0
SORE THROAT: 0
NAUSEA: 0
DYSURIA: 0
MYALGIAS: 0
JOINT SWELLING: 0
SHORTNESS OF BREATH: 0
HEARTBURN: 0
HEADACHES: 0
COUGH: 0
PALPITATIONS: 0
HEMATURIA: 0
EYE PAIN: 0
CHILLS: 0
DIZZINESS: 0
BREAST MASS: 0
CONSTIPATION: 0
ARTHRALGIAS: 0
ABDOMINAL PAIN: 0
HEMATOCHEZIA: 0
PARESTHESIAS: 0
DIARRHEA: 0

## 2020-07-21 ASSESSMENT — MIFFLIN-ST. JEOR: SCORE: 1327.58

## 2020-07-21 NOTE — PROGRESS NOTES
SUBJECTIVE:   CC: Keara Ayon is an 35 year old woman who presents for preventive health visit.     Here for physical, needs OCP refilled.  Wants to go back to the one she was using before her current medication.  States that she has almost no period on this current one, and would like to go back to how she was before.    In the last 6 months, feels some pain over right breast.  Has some pain when kids lay on chest or when she pushed on it, no pain at rest.    Occasionally has rash on arms, which seems to be related to going out in the sun.  Has not used sunscreen, somewhat itchy, has not tried hydrocortisone cream.    Wonders if she needs to take a Vitamin D supplement.  Has no history of deficiency, has no signs or symptoms of fatigue or deficiency.    Healthy Habits:     Getting at least 3 servings of Calcium per day:  Yes    Bi-annual eye exam:  NO    Dental care twice a year:  Yes    Sleep apnea or symptoms of sleep apnea:  None    Diet:  Regular (no restrictions)    Frequency of exercise:  1 day/week    Duration of exercise:  Less than 15 minutes    Taking medications regularly:  Yes    Medication side effects:  None    PHQ-2 Total Score: 0    Additional concerns today:  No        Today's PHQ-2 Score:   PHQ-2 ( 1999 Pfizer) 7/21/2020   Q1: Little interest or pleasure in doing things 0   Q2: Feeling down, depressed or hopeless 0   PHQ-2 Score 0   Q1: Little interest or pleasure in doing things Not at all   Q2: Feeling down, depressed or hopeless Not at all   PHQ-2 Score 0       Abuse: Current or Past(Physical, Sexual or Emotional)- No  Do you feel safe in your environment? Yes        Social History     Tobacco Use     Smoking status: Never Smoker     Smokeless tobacco: Never Used   Substance Use Topics     Alcohol use: No     Alcohol/week: 0.0 standard drinks         Alcohol Use 7/21/2020   Prescreen: >3 drinks/day or >7 drinks/week? Not Applicable   Prescreen: >3 drinks/day or >7 drinks/week? -        Reviewed orders with patient.  Reviewed health maintenance and updated orders accordingly - Yes  Patient Active Problem List   Diagnosis     Acute reaction to stress     CARDIOVASCULAR SCREENING; LDL GOAL LESS THAN 160     Postpartum depression     H/O hyperthyroidism     Overweight     History of gestational diabetes     Past Surgical History:   Procedure Laterality Date     NO HISTORY OF SURGERY         Social History     Tobacco Use     Smoking status: Never Smoker     Smokeless tobacco: Never Used   Substance Use Topics     Alcohol use: No     Alcohol/week: 0.0 standard drinks     Family History   Problem Relation Age of Onset     Diabetes Mother      Cardiovascular Mother         unknown      Thyroid Disease Mother      Diabetes Father      Family History Negative Sister      Family History Negative Brother      Family History Negative Son      Family History Negative Son      Family History Negative Daughter      Eye Disorder Paternal Grandmother          Current Outpatient Medications   Medication Sig Dispense Refill     levonorgestrel-ethinyl estradiol (LEVORA 0.15/30, 28,) 0.15-30 MG-MCG tablet Take 1 tablet by mouth daily 84 tablet 3     multivitamin, therapeutic (THERA-VIT) TABS tablet Take 1 tablet by mouth daily       No Known Allergies    Mammogram Screening: Patient under age 50, mutual decision reflected in health maintenance.      Pertinent mammograms are reviewed under the imaging tab.  History of abnormal Pap smear: NO - age 30-65 PAP every 5 years with negative HPV co-testing recommended  PAP / HPV Latest Ref Rng & Units 7/22/2016 4/11/2013 3/29/2012   PAP - NIL NIL NIL   HPV 16 DNA NEG Negative - -   HPV 18 DNA NEG Negative - -   OTHER HR HPV NEG Negative - -     Reviewed and updated as needed this visit by clinical staff  Tobacco  Allergies  Meds  Med Hx  Surg Hx  Fam Hx  Soc Hx        Reviewed and updated as needed this visit by Provider  Tobacco  Meds  Med Hx  Surg Hx  Fam Hx  " Soc Hx           Review of Systems   Constitutional: Negative for chills and fever.   HENT: Negative for congestion, ear pain, hearing loss and sore throat.    Eyes: Negative for pain and visual disturbance.   Respiratory: Negative for cough and shortness of breath.    Cardiovascular: Negative for chest pain, palpitations and peripheral edema.   Gastrointestinal: Negative for abdominal pain, constipation, diarrhea, heartburn, hematochezia and nausea.   Breasts:  Negative for tenderness, breast mass and discharge.   Genitourinary: Negative for dysuria, frequency, genital sores, hematuria, urgency, vaginal bleeding and vaginal discharge.   Musculoskeletal: Negative for arthralgias, joint swelling and myalgias.   Skin: Negative for rash.   Neurological: Negative for dizziness, headaches and paresthesias.   Psychiatric/Behavioral: Negative for mood changes. The patient is not nervous/anxious.           OBJECTIVE:   /80 (BP Location: Right arm, Patient Position: Chair, Cuff Size: Adult Regular)   Pulse 94   Temp 99.1  F (37.3  C) (Oral)   Ht 1.592 m (5' 2.68\")   Wt 67.9 kg (149 lb 9.6 oz)   SpO2 99%   BMI 26.77 kg/m    Physical Exam  GENERAL: healthy, alert and no distress  EYES: Eyes grossly normal to inspection, PERRL and conjunctivae and sclerae normal  HENT: ear canals and TM's normal, nose and mouth without ulcers or lesions  NECK: no adenopathy, no asymmetry, masses, or scars and thyroid normal to palpation  RESP: lungs clear to auscultation - no rales, rhonchi or wheezes  BREAST: Right breast tender to palpation around the 2 o'clock position, ~3 cm from nipple.  Small palpable lump/tissue, slightly tender to palpation.  CV: regular rate and rhythm, normal S1 S2, no S3 or S4, no murmur, click or rub, no peripheral edema and peripheral pulses strong  ABDOMEN: soft, nontender, no hepatosplenomegaly, no masses and bowel sounds normal  MS: no gross musculoskeletal defects noted, no edema  SKIN: no " "suspicious lesions.  A few scattered erythematous raised patches on bilateral forearms.  NEURO: Normal strength and tone, mentation intact and speech normal  PSYCH: mentation appears normal, affect normal/bright    Diagnostic Test Results:  Labs reviewed in Epic    ASSESSMENT/PLAN:   1. Routine history and physical examination of adult  Reviewed history and updated health maintenance items as able.  Declined HIV screening.  Declined Tdap today, will defer to next year.      2. Encounter for surveillance of contraceptive pills  Discontinue current OCP, will switch to previous medication.  Follow up one year or sooner as needed.  - levonorgestrel-ethinyl estradiol (LEVORA 0.15/30, 28,) 0.15-30 MG-MCG tablet; Take 1 tablet by mouth daily  Dispense: 84 tablet; Refill: 3    3. Breast pain, right  Will order US to evaluate breast pain.  Follow up pending results.  - US Breast Bilateral Limited 1-3 Quadrants; Future    4.  Dermatitis  Recommended topical hydrocortisone cream for itching, recommended trial of sunscreen prior to sun exposure.  IF that does not work may try OTC antihistamine.  Follow up as needed.    COUNSELING:  Reviewed preventive health counseling, as reflected in patient instructions  Special attention given to:        Immunizations    Declined: TDAP due to Other                Contraception       HIV screeninx in teen years, 1x in adult years, and at intervals if high risk    Estimated body mass index is 26.77 kg/m  as calculated from the following:    Height as of this encounter: 1.592 m (5' 2.68\").    Weight as of this encounter: 67.9 kg (149 lb 9.6 oz).    Weight management plan: Discussed healthy diet and exercise guidelines     reports that she has never smoked. She has never used smokeless tobacco.      Counseling Resources:  ATP IV Guidelines  Pooled Cohorts Equation Calculator  Breast Cancer Risk Calculator  FRAX Risk Assessment  ICSI Preventive Guidelines  Dietary Guidelines for Americans, " 2010  USDA's MyPlate  ASA Prophylaxis  Lung CA Screening    April BALDEV Ring MD  [facility]

## 2020-07-21 NOTE — PATIENT INSTRUCTIONS
1.  Vitamin D3 1000 international unit(s) daily is ok to take.    2.  Topical hydrocortisone cream, 0.5 or 1% twice per day as needed for itching/rash on arms.    3.  Use sunscreen when outside, at least SPF 30.    4.  If rash persists despite sunscreen, may try Claritin, Zyrtec, or Allegra as needed.    5.  [facility] [phone],   Toll Free: [phone]

## 2021-07-08 ENCOUNTER — NURSE TRIAGE (OUTPATIENT)
Dept: FAMILY MEDICINE | Facility: CLINIC | Age: 36
End: 2021-07-08

## 2021-07-08 NOTE — TELEPHONE ENCOUNTER
S-(situation): Patient calling experiencing abnormal vaginal bleeding.     B-(background): Patient's period typically last 4 days and are regular. Patient's period started 6/30/21 and has continued to today. Patient is on birth control and is taking regularly. Patient also takes daily multivitamin. Patient reportedly not taking any blood thinners.     A-(assessment): Patient states on 5th or 6th day, period was fairly heavy, reported as moderate (1-2 pads/hour). Today and yesterday bleeding has been mild, 1 pad/4 hours. No abdominal pain. Patient states she does feel weak and more tired than usual. Patient is able to stand and walk around normally performing normal activities.     R-(recommendations): Per protocol, advised visit within 2 weeks in office. Offered to schedule appointment. Patient declines visit as feels insurance would not cover visit. Encouraged patient to rest and drink increased amount of fluids, change positions slowly. Encouraged patient to call back if symptoms worsen or change or has increase in dizziness/lightheadedness. Patient verbalized understanding and plans to call back to schedule appointment.     Reason for Disposition    Periods last > 7 days    Additional Information    Negative: SEVERE vaginal bleeding (e.g., continuous red blood from vagina, or large blood clots) and very weak (can't stand)    Negative: Passed out (i.e., fainted, collapsed and was not responding)    Negative: Difficult to awaken or acting confused (e.g., disoriented, slurred speech)    Negative: Shock suspected (e.g., cold/pale/clammy skin, too weak to stand, low BP, rapid pulse)    Negative: Sounds like a life-threatening emergency to the triager    Negative: Pregnant > 20 weeks (5 months or more)    Negative: Pregnant < 20 weeks (less than 5 months)    Negative: Postpartum (from 0 to 6 weeks after delivery)    Negative: Vaginal discharge is the main symptom and bleeding is slight    Negative: SEVERE abdominal  "pain (e.g., excruciating)    Negative: SEVERE dizziness (e.g., unable to stand, requires support to walk, feels like passing out now)    Negative: SEVERE vaginal bleeding (e.g., soaking 2 pads or tampons per hour and present 2 or more hours; 1 menstrual cup every 2 hours)    Negative: MODERATE vaginal bleeding (e.g., soaking pad or tampon per hour and present > 6 hours; 1 menstrual cup every 6 hours)    Negative: Pale skin (pallor) of new onset or worsening    Negative: Constant abdominal pain lasting > 2 hours    Negative: Patient sounds very sick or weak to the triager    Negative: Taking Coumadin (warfarin) or other strong blood thinner, or known bleeding disorder (e.g., thrombocytopenia)    Negative: Skin bruises or nosebleed and not caused by an injury    Negative: Bleeding/spotting after procedure (e.g., biopsy) or pelvic examination (e.g., pap smear) that persists > 3 days    Negative: Patient wants to be seen    Negative: Passed tissue (e.g., gray-white)    Negative: Periods with > 6 soaked pads or tampons per day    Answer Assessment - Initial Assessment Questions  1. AMOUNT: \"Describe the bleeding that you are having.\"     - SPOTTING: spotting, or pinkish / brownish mucous discharge; does not fill panti-liner or pad     - MILD:  less than 1 pad / hour; less than patient's usual menstrual bleeding    - MODERATE: 1-2 pads / hour; 1 menstrual cup every 6 hours; small-medium blood clots (e.g., pea, grape, small coin)    - SEVERE: soaking 2 or more pads/hour for 2 or more hours; 1 menstrual cup every 2 hours; bleeding not contained by pads or continuous red blood from vagina; large blood clots (e.g., golf ball, large coin)       Mild today and yesterday, on 5th and 6th day was moderate  2. ONSET: \"When did the bleeding begin?\" \"Is it continuing now?\"      Wednesday, continuing now  3. MENSTRUAL PERIOD: \"When was the last normal menstrual period?\" \"How is this different than your period?\"      Last month, " "normally lasts 4 days. This period is on 8th day.   4. REGULARITY: \"How regular are your periods?\"      Typically regular, this is first time it has gone this long   5. ABDOMINAL PAIN: \"Do you have any pain?\" \"How bad is the pain?\"  (e.g., Scale 1-10; mild, moderate, or severe)    - MILD (1-3): doesn't interfere with normal activities, abdomen soft and not tender to touch     - MODERATE (4-7): interferes with normal activities or awakens from sleep, tender to touch     - SEVERE (8-10): excruciating pain, doubled over, unable to do any normal activities       No pain  6. PREGNANCY: \"Could you be pregnant?\" \"Are you sexually active?\" \"Did you recently give birth?\"      Don't think i'm pregnant, on the pill, does not miss doses  7. BREASTFEEDING: \"Are you breastfeeding?\"      no  8. HORMONES: \"Are you taking any hormone medications, prescription or OTC?\" (e.g., birth control pills, estrogen)      Birth control pills   9. BLOOD THINNERS: \"Do you take any blood thinners?\" (e.g., Coumadin/warfarin, Pradaxa/dabigatran, aspirin)      no  10. CAUSE: \"What do you think is causing the bleeding?\" (e.g., recent gyn surgery, recent gyn procedure; known bleeding disorder, cervical cancer, polycystic ovarian disease, fibroids)          No idea  11. HEMODYNAMIC STATUS: \"Are you weak or feeling lightheaded?\" If so, ask: \"Can you stand and walk normally?\"         Feeling weak, little energy. Can stand and walk normally   12. OTHER SYMPTOMS: \"What other symptoms are you having with the bleeding?\" (e.g., passed tissue, vaginal discharge, fever, menstrual-type cramps)        no    Protocols used: VAGINAL BLEEDING - [id number]    Jimmie BUENO RN    "

## 2021-08-15 DIAGNOSIS — Z30.41 ENCOUNTER FOR SURVEILLANCE OF CONTRACEPTIVE PILLS: ICD-10-CM

## 2021-08-17 RX ORDER — LEVONORGESTREL AND ETHINYL ESTRADIOL 0.15-0.03
KIT ORAL
Qty: 84 TABLET | Refills: 0 | Status: SHIPPED | OUTPATIENT
Start: 2021-08-17 | End: 2021-09-01

## 2021-08-17 NOTE — TELEPHONE ENCOUNTER
3 month deena refill approved.     MA/TC: Please assist patient in scheduling office visit.     Angeles Erwin RN on 8/17/2021 at 10:56 AM

## 2021-09-01 ENCOUNTER — OFFICE VISIT (OUTPATIENT)
Dept: FAMILY MEDICINE | Facility: CLINIC | Age: 36
End: 2021-09-01
Payer: COMMERCIAL

## 2021-09-01 VITALS
DIASTOLIC BLOOD PRESSURE: 80 MMHG | HEIGHT: 63 IN | WEIGHT: 152 LBS | RESPIRATION RATE: 19 BRPM | HEART RATE: 96 BPM | TEMPERATURE: 99.8 F | BODY MASS INDEX: 26.93 KG/M2 | SYSTOLIC BLOOD PRESSURE: 112 MMHG | OXYGEN SATURATION: 99 %

## 2021-09-01 DIAGNOSIS — Z30.41 ENCOUNTER FOR SURVEILLANCE OF CONTRACEPTIVE PILLS: ICD-10-CM

## 2021-09-01 DIAGNOSIS — Z00.00 ROUTINE GENERAL MEDICAL EXAMINATION AT A HEALTH CARE FACILITY: Primary | ICD-10-CM

## 2021-09-01 DIAGNOSIS — N64.4 BREAST PAIN, RIGHT: ICD-10-CM

## 2021-09-01 LAB
ERYTHROCYTE [DISTWIDTH] IN BLOOD BY AUTOMATED COUNT: 13.2 % (ref 10–15)
HBA1C MFR BLD: 5.7 % (ref 0–5.6)
HCT VFR BLD AUTO: 39.9 % (ref 35–47)
HGB BLD-MCNC: 13.3 G/DL (ref 11.7–15.7)
MCH RBC QN AUTO: 29 PG (ref 26.5–33)
MCHC RBC AUTO-ENTMCNC: 33.3 G/DL (ref 31.5–36.5)
MCV RBC AUTO: 87 FL (ref 78–100)
PLATELET # BLD AUTO: 263 10E3/UL (ref 150–450)
RBC # BLD AUTO: 4.58 10E6/UL (ref 3.8–5.2)
WBC # BLD AUTO: 8.5 10E3/UL (ref 4–11)

## 2021-09-01 PROCEDURE — 83036 HEMOGLOBIN GLYCOSYLATED A1C: CPT | Performed by: FAMILY MEDICINE

## 2021-09-01 PROCEDURE — 99213 OFFICE O/P EST LOW 20 MIN: CPT | Mod: 25 | Performed by: FAMILY MEDICINE

## 2021-09-01 PROCEDURE — 36415 COLL VENOUS BLD VENIPUNCTURE: CPT | Performed by: FAMILY MEDICINE

## 2021-09-01 PROCEDURE — 85027 COMPLETE CBC AUTOMATED: CPT | Performed by: FAMILY MEDICINE

## 2021-09-01 PROCEDURE — 80053 COMPREHEN METABOLIC PANEL: CPT | Performed by: FAMILY MEDICINE

## 2021-09-01 PROCEDURE — 84443 ASSAY THYROID STIM HORMONE: CPT | Performed by: FAMILY MEDICINE

## 2021-09-01 PROCEDURE — 99395 PREV VISIT EST AGE 18-39: CPT | Performed by: FAMILY MEDICINE

## 2021-09-01 RX ORDER — LEVONORGESTREL AND ETHINYL ESTRADIOL 0.15-0.03
1 KIT ORAL DAILY
Qty: 84 TABLET | Refills: 3 | Status: SHIPPED | OUTPATIENT
Start: 2021-09-01 | End: 2022-06-20

## 2021-09-01 ASSESSMENT — ENCOUNTER SYMPTOMS
CHILLS: 0
CONSTIPATION: 0
DIARRHEA: 0
PARESTHESIAS: 0
FREQUENCY: 0
NERVOUS/ANXIOUS: 0
MYALGIAS: 0
HEADACHES: 0
EYE PAIN: 0
SHORTNESS OF BREATH: 0
ABDOMINAL PAIN: 0
SORE THROAT: 0
WEAKNESS: 0
NAUSEA: 0
FEVER: 0
DYSURIA: 0
DIZZINESS: 0
BREAST MASS: 0
PALPITATIONS: 0
HEMATOCHEZIA: 0
COUGH: 0
ARTHRALGIAS: 0
HEMATURIA: 0
JOINT SWELLING: 0
HEARTBURN: 0

## 2021-09-01 ASSESSMENT — MIFFLIN-ST. JEOR: SCORE: 1338.6

## 2021-09-01 NOTE — PROGRESS NOTES
SUBJECTIVE:   CC: Keara Ayon is an 36 year old woman who presents for preventive health visit.     Needs physical.    Needs Rx refilled for birth control.    Continues to have right sided breast pain, did not do the mammogram or US last year. Nothing new or different about pain. Still located on right breast, 3 o'clock position, medial to nipple.  Tenderness to palpation.    Of note:  In July, had very heavy bleeding, called nurses line, advised to monitor and follow up as needed.  Last two periods have been fine, no concerns.        Patient has been advised of split billing requirements and indicates understanding: Yes  Healthy Habits:     Getting at least 3 servings of Calcium per day:  Yes    Bi-annual eye exam:  NO    Dental care twice a year:  Yes    Sleep apnea or symptoms of sleep apnea:  None    Diet:  Regular (no restrictions)    Frequency of exercise:  2-3 days/week    Duration of exercise:  15-30 minutes    Taking medications regularly:  Not Applicable    Medication side effects:  None    PHQ-2 Total Score: 0    Additional concerns today:  No          Today's PHQ-2 Score:   PHQ-2 ( 1999 [employer]) 9/1/2021   Q1: Little interest or pleasure in doing things 0   Q2: Feeling down, depressed or hopeless 0   PHQ-2 Score 0   Q1: Little interest or pleasure in doing things Not at all   Q2: Feeling down, depressed or hopeless Not at all   PHQ-2 Score 0       Abuse: Current or Past (Physical, Sexual or Emotional) - No  Do you feel safe in your environment? Yes    Have you ever done Advance Care Planning? (For example, a Health Directive, POLST, or a discussion with a medical provider or your loved ones about your wishes): No, advance care planning information given to patient to review.  Patient plans to discuss their wishes with loved ones or provider.      Social History     Tobacco Use     Smoking status: Never Smoker     Smokeless tobacco: Never Used   Substance Use Topics     Alcohol use: No      Alcohol/week: 0.0 standard drinks         Alcohol Use 9/1/2021   Prescreen: >3 drinks/day or >7 drinks/week? No   Prescreen: >3 drinks/day or >7 drinks/week? -       Reviewed orders with patient.  Reviewed health maintenance and updated orders accordingly - Yes  Lab work is in process  Labs reviewed in EPIC  BP Readings from Last 3 Encounters:   09/01/21 112/80   07/21/20 110/80   05/07/18 120/74    Wt Readings from Last 3 Encounters:   09/01/21 68.9 kg (152 lb)   07/21/20 67.9 kg (149 lb 9.6 oz)   05/07/18 65.2 kg (143 lb 12.8 oz)                  Patient Active Problem List   Diagnosis     Acute reaction to stress     CARDIOVASCULAR SCREENING; LDL GOAL LESS THAN 160     Postpartum depression     H/O hyperthyroidism     Overweight     History of gestational diabetes     Past Surgical History:   Procedure Laterality Date     NO HISTORY OF SURGERY         Social History     Tobacco Use     Smoking status: Never Smoker     Smokeless tobacco: Never Used   Substance Use Topics     Alcohol use: No     Alcohol/week: 0.0 standard drinks     Family History   Problem Relation Age of Onset     Diabetes Mother      Cardiovascular Mother         unknown      Thyroid Disease Mother      Diabetes Father      Family History Negative Sister      Family History Negative Brother      Family History Negative Son      Family History Negative Son      Family History Negative Daughter      Eye Disorder Paternal Grandmother          Current Outpatient Medications   Medication Sig Dispense Refill     levonorgestrel-ethinyl estradiol (KURVELO) 0.15-30 MG-MCG tablet Take 1 tablet by mouth daily 84 tablet 3     multivitamin, therapeutic (THERA-VIT) TABS tablet Take 1 tablet by mouth daily       No Known Allergies  Recent Labs   Lab Test 09/01/21  1546 07/25/16  0911 06/03/14  1452 10/23/13  1600 10/23/13  1600   A1C 5.7*  --   --   --  5.7   LDL  --  84 78  --   --    HDL  --  49* 42*  --   --    TRIG  --  81 133  --   --    ALT 37 24  --    --   --    CR 0.73 0.69  --   --  0.71   GFRESTIMATED >90 >90  Non African American GFR Calc    --   --  >90   GFRESTBLACK  --  >90   GFR Calc    --   --  >90   POTASSIUM 3.6 4.2  --   --  4.0   TSH 1.23 1.26 1.04   < >  --     < > = values in this interval not displayed.        Breast Cancer Screening:    Breast CA Risk Assessment (FHS-7) 9/1/2021   Do you have a family history of breast, colon, or ovarian cancer? No / Unknown         Patient under 40 years of age: Routine Mammogram Screening not recommended.   Pertinent mammograms are reviewed under the imaging tab.    History of abnormal Pap smear: NO - age 30-65 PAP every 5 years with negative HPV co-testing recommended  PAP / HPV Latest Ref Rng & Units 7/22/2016 4/11/2013 3/29/2012   PAP (Historical) - NIL NIL NIL   HPV16 NEG Negative - -   HPV18 NEG Negative - -   HRHPV NEG Negative - -     Reviewed and updated as needed this visit by clinical staff  Tobacco  Allergies  Meds  Problems  Med Hx  Surg Hx  Fam Hx  Soc Hx          Reviewed and updated as needed this visit by Provider  Tobacco  Allergies  Meds  Problems  Med Hx  Surg Hx  Fam Hx         Past Medical History:   Diagnosis Date     H/O hyperthyroidism 4/16/2013    post partum       Past Surgical History:   Procedure Laterality Date     NO HISTORY OF SURGERY         Review of Systems   Constitutional: Negative for chills and fever.   HENT: Negative for congestion, ear pain, hearing loss and sore throat.    Eyes: Negative for pain and visual disturbance.   Respiratory: Negative for cough and shortness of breath.    Cardiovascular: Negative for chest pain, palpitations and peripheral edema.   Gastrointestinal: Negative for abdominal pain, constipation, diarrhea, heartburn, hematochezia and nausea.   Breasts:  Negative for tenderness, breast mass and discharge.   Genitourinary: Negative for dysuria, frequency, genital sores, hematuria, pelvic pain, urgency, vaginal bleeding and  "vaginal discharge.   Musculoskeletal: Negative for arthralgias, joint swelling and myalgias.   Skin: Negative for rash.   Neurological: Negative for dizziness, weakness, headaches and paresthesias.   Psychiatric/Behavioral: Negative for mood changes. The patient is not nervous/anxious.           OBJECTIVE:   /80 (BP Location: Right arm, Patient Position: Chair, Cuff Size: Adult Regular)   Pulse 96   Temp 99.8  F (37.7  C) (Oral)   Resp 19   Ht 1.6 m (5' 3\")   Wt 68.9 kg (152 lb)   LMP 08/25/2021   SpO2 99%   BMI 26.93 kg/m    Physical Exam  GENERAL: healthy, alert and no distress  EYES: Eyes grossly normal to inspection, PERRL and conjunctivae and sclerae normal  HENT: ear canals and TM's normal, nose and mouth without ulcers or lesions  NECK: no adenopathy, no asymmetry, masses, or scars and thyroid normal to palpation  RESP: lungs clear to auscultation - no rales, rhonchi or wheezes  BREAST: normal without masses, tenderness or nipple discharge and no palpable axillary masses or adenopathy  CV: regular rate and rhythm, normal S1 S2, no S3 or S4, no murmur, click or rub, no peripheral edema and peripheral pulses strong  ABDOMEN: soft, nontender, no hepatosplenomegaly, no masses and bowel sounds normal  MS: no gross musculoskeletal defects noted, no edema  SKIN: no suspicious lesions or rashes  NEURO: Normal strength and tone, mentation intact and speech normal  PSYCH: mentation appears normal, affect normal/bright    Diagnostic Test Results:  Labs reviewed in Epic    ASSESSMENT/PLAN:   (Z00.00) Routine general medical examination at a health care facility  (primary encounter diagnosis)  Comment: Patient declined Pap today.  Plan: CBC with platelets, Comprehensive metabolic         panel (BMP + Alb, Alk Phos, ALT, AST, Total.         Bili, TP), TSH with free T4 reflex, Hemoglobin         A1c        Exam completed today, routine health maintenance items updated as able.  Labs ordered.  Follow up one " "year or sooner as needed.    (Z30.41) Encounter for surveillance of contraceptive pills  Comment: Stable  Plan: levonorgestrel-ethinyl estradiol (KURVELO)         0.15-30 MG-MCG tablet        Refill OCP, follow up in one year.    (N64.4) Breast pain, right  Comment: Patient did not complete recommended imaging last year for same complaint.    Plan: Recommended Mammogram and Ultrasound to investigate, normal examination.  Suspect possible breast cyst.  Patient declined imaging today, will monitor.    Patient has been advised of split billing requirements and indicates understanding: Yes  COUNSELING:  Reviewed preventive health counseling, as reflected in patient instructions    Estimated body mass index is 26.93 kg/m  as calculated from the following:    Height as of this encounter: 1.6 m (5' 3\").    Weight as of this encounter: 68.9 kg (152 lb).    Weight management plan: Discussed healthy diet and exercise guidelines    She reports that she has never smoked. She has never used smokeless tobacco.      Counseling Resources:  ATP IV Guidelines  Pooled Cohorts Equation Calculator  Breast Cancer Risk Calculator  BRCA-Related Cancer Risk Assessment: FHS-7 Tool  FRAX Risk Assessment  ICSI Preventive Guidelines  Dietary Guidelines for Americans, 2010  USDA's MyPlate  ASA Prophylaxis  Lung CA Screening    April J. Charles Ring MD  [facility]"

## 2021-09-01 NOTE — PATIENT INSTRUCTIONS
Check with insurance regarding Pap Smear, Tetanus shot, and Hepatitis C screening test.    Preventive Health Recommendations  Female Ages 26 - 39  Yearly exam:   See your health care provider every year in order to    Review health changes.     Discuss preventive care.      Review your medicines if you your doctor has prescribed any.    Until age 30: Get a Pap test every three years (more often if you have had an abnormal result).    After age 30: Talk to your doctor about whether you should have a Pap test every 3 years or have a Pap test with HPV screening every 5 years.   You do not need a Pap test if your uterus was removed (hysterectomy) and you have not had cancer.  You should be tested each year for STDs (sexually transmitted diseases), if you're at risk.   Talk to your provider about how often to have your cholesterol checked.  If you are at risk for diabetes, you should have a diabetes test (fasting glucose).  Shots: Get a flu shot each year. Get a tetanus shot every 10 years.   Nutrition:     Eat at least 5 servings of fruits and vegetables each day.    Eat whole-grain bread, whole-wheat pasta and brown rice instead of white grains and rice.    Get adequate Calcium and Vitamin D.     Lifestyle    Exercise at least 150 minutes a week (30 minutes a day, 5 days of the week). This will help you control your weight and prevent disease.    Limit alcohol to one drink per day.    No smoking.     Wear sunscreen to prevent skin cancer.    See your dentist every six months for an exam and cleaning.

## 2021-09-03 LAB
ALBUMIN SERPL-MCNC: 3.6 G/DL (ref 3.4–5)
ALP SERPL-CCNC: 56 U/L (ref 40–150)
ALT SERPL W P-5'-P-CCNC: 37 U/L (ref 0–50)
ANION GAP SERPL CALCULATED.3IONS-SCNC: 7 MMOL/L (ref 3–14)
AST SERPL W P-5'-P-CCNC: 21 U/L (ref 0–45)
BILIRUB SERPL-MCNC: 0.2 MG/DL (ref 0.2–1.3)
BUN SERPL-MCNC: 8 MG/DL (ref 7–30)
CALCIUM SERPL-MCNC: 9 MG/DL (ref 8.5–10.1)
CHLORIDE BLD-SCNC: 107 MMOL/L (ref 94–109)
CO2 SERPL-SCNC: 24 MMOL/L (ref 20–32)
CREAT SERPL-MCNC: 0.73 MG/DL (ref 0.52–1.04)
GFR SERPL CREATININE-BSD FRML MDRD: >90 ML/MIN/1.73M2
GLUCOSE BLD-MCNC: 111 MG/DL (ref 70–99)
POTASSIUM BLD-SCNC: 3.6 MMOL/L (ref 3.4–5.3)
PROT SERPL-MCNC: 7.6 G/DL (ref 6.8–8.8)
SODIUM SERPL-SCNC: 138 MMOL/L (ref 133–144)
TSH SERPL DL<=0.005 MIU/L-ACNC: 1.23 MU/L (ref 0.4–4)

## 2021-09-25 ENCOUNTER — HEALTH MAINTENANCE LETTER (OUTPATIENT)
Age: 36
End: 2021-09-25

## 2021-11-07 DIAGNOSIS — Z30.41 ENCOUNTER FOR SURVEILLANCE OF CONTRACEPTIVE PILLS: ICD-10-CM

## 2021-11-08 RX ORDER — LEVONORGESTREL AND ETHINYL ESTRADIOL 0.15-0.03
KIT ORAL
Qty: 84 TABLET | Refills: 3 | OUTPATIENT
Start: 2021-11-08

## 2022-05-18 ENCOUNTER — TELEPHONE (OUTPATIENT)
Dept: FAMILY MEDICINE | Facility: CLINIC | Age: 37
End: 2022-05-18

## 2022-05-18 NOTE — TELEPHONE ENCOUNTER
Patient Quality Outreach    Patient is due for the following:   Cervical Cancer Screening - PAP Needed    NEXT STEPS:   Patient has upcoming appointment, these items will be addressed at that time.    Type of outreach:    Chart review performed, no outreach needed.      Questions for provider review:    None     Myke Melvin

## 2022-06-20 ENCOUNTER — OFFICE VISIT (OUTPATIENT)
Dept: FAMILY MEDICINE | Facility: CLINIC | Age: 37
End: 2022-06-20
Payer: COMMERCIAL

## 2022-06-20 VITALS
OXYGEN SATURATION: 99 % | HEART RATE: 85 BPM | HEIGHT: 63 IN | DIASTOLIC BLOOD PRESSURE: 76 MMHG | WEIGHT: 149 LBS | SYSTOLIC BLOOD PRESSURE: 117 MMHG | BODY MASS INDEX: 26.4 KG/M2 | TEMPERATURE: 98.2 F

## 2022-06-20 DIAGNOSIS — N64.4 BREAST PAIN, RIGHT: ICD-10-CM

## 2022-06-20 DIAGNOSIS — Z12.4 CERVICAL CANCER SCREENING: ICD-10-CM

## 2022-06-20 DIAGNOSIS — R73.03 PREDIABETES: ICD-10-CM

## 2022-06-20 DIAGNOSIS — Z86.39 H/O HYPERTHYROIDISM: ICD-10-CM

## 2022-06-20 DIAGNOSIS — Z00.00 ROUTINE GENERAL MEDICAL EXAMINATION AT A HEALTH CARE FACILITY: Primary | ICD-10-CM

## 2022-06-20 DIAGNOSIS — Z30.41 ENCOUNTER FOR SURVEILLANCE OF CONTRACEPTIVE PILLS: ICD-10-CM

## 2022-06-20 LAB — HBA1C MFR BLD: 5.8 % (ref 0–5.6)

## 2022-06-20 PROCEDURE — 36415 COLL VENOUS BLD VENIPUNCTURE: CPT | Performed by: FAMILY MEDICINE

## 2022-06-20 PROCEDURE — 99214 OFFICE O/P EST MOD 30 MIN: CPT | Mod: 25 | Performed by: FAMILY MEDICINE

## 2022-06-20 PROCEDURE — 99395 PREV VISIT EST AGE 18-39: CPT | Performed by: FAMILY MEDICINE

## 2022-06-20 PROCEDURE — 83036 HEMOGLOBIN GLYCOSYLATED A1C: CPT | Performed by: FAMILY MEDICINE

## 2022-06-20 PROCEDURE — 84443 ASSAY THYROID STIM HORMONE: CPT | Performed by: FAMILY MEDICINE

## 2022-06-20 RX ORDER — LEVONORGESTREL AND ETHINYL ESTRADIOL 0.15-0.03
1 KIT ORAL DAILY
Qty: 84 TABLET | Refills: 3 | Status: SHIPPED | OUTPATIENT
Start: 2022-06-20 | End: 2023-05-22

## 2022-06-20 ASSESSMENT — ENCOUNTER SYMPTOMS
SORE THROAT: 0
MYALGIAS: 0
COUGH: 0
SHORTNESS OF BREATH: 0
PALPITATIONS: 0
FEVER: 0
EYE PAIN: 0
HEMATURIA: 0
DYSURIA: 0
CONSTIPATION: 0
DIZZINESS: 0
HEMATOCHEZIA: 0
HEARTBURN: 0
FREQUENCY: 0
ABDOMINAL PAIN: 0
BREAST MASS: 1
DIARRHEA: 0
JOINT SWELLING: 0
HEADACHES: 0
NAUSEA: 0
PARESTHESIAS: 0
ARTHRALGIAS: 0
WEAKNESS: 0
NERVOUS/ANXIOUS: 0
CHILLS: 0

## 2022-06-20 NOTE — PATIENT INSTRUCTIONS
Call to schedule mammogram and ultrasound at [facility] [phone]      Preventive Health Recommendations  Female Ages 26 - 39  Yearly exam:   See your health care provider every year in order to  Review health changes.   Discuss preventive care.    Review your medicines if you your doctor has prescribed any.    Until age 30: Get a Pap test every three years (more often if you have had an abnormal result).    After age 30: Talk to your doctor about whether you should have a Pap test every 3 years or have a Pap test with HPV screening every 5 years.   You do not need a Pap test if your uterus was removed (hysterectomy) and you have not had cancer.  You should be tested each year for STDs (sexually transmitted diseases), if you're at risk.   Talk to your provider about how often to have your cholesterol checked.  If you are at risk for diabetes, you should have a diabetes test (fasting glucose).  Shots: Get a flu shot each year. Get a tetanus shot every 10 years.   Nutrition:   Eat at least 5 servings of fruits and vegetables each day.  Eat whole-grain bread, whole-wheat pasta and brown rice instead of white grains and rice.  Get adequate Calcium and Vitamin D.     Lifestyle  Exercise at least 150 minutes a week (30 minutes a day, 5 days of the week). This will help you control your weight and prevent disease.  Limit alcohol to one drink per day.  No smoking.   Wear sunscreen to prevent skin cancer.  See your dentist every six months for an exam and cleaning.

## 2022-06-20 NOTE — PROGRESS NOTES
SUBJECTIVE:   CC: Keara Ayon is an 37 year old woman who presents for preventive health visit.     Would like her physical today and birth control filled.    Does still have some right sided breast pain with pressure, nothing new or different.  Did not do the imaging that was recommended.  Breast pain does not have any particular pattern.      Healthy Habits:     Getting at least 3 servings of Calcium per day:  Yes    Bi-annual eye exam:  NO    Dental care twice a year:  Yes    Sleep apnea or symptoms of sleep apnea:  None    Diet:  Regular (no restrictions)    Frequency of exercise:  1 day/week    Duration of exercise:  15-30 minutes    Taking medications regularly:  Not Applicable    Medication side effects:  None    PHQ-2 Total Score: 0    Additional concerns today:  No      Patient doesn't want to get her pap today    Today's PHQ-2 Score:   PHQ-2 ( 1999 Pfizer) 6/20/2022   Q1: Little interest or pleasure in doing things 0   Q2: Feeling down, depressed or hopeless 0   PHQ-2 Score 0   PHQ-2 Total Score (12-17 Years)- Positive if 3 or more points; Administer PHQ-A if positive -   Q1: Little interest or pleasure in doing things Not at all   Q2: Feeling down, depressed or hopeless Not at all   PHQ-2 Score 0       Abuse: Current or Past (Physical, Sexual or Emotional) - No  Do you feel safe in your environment? Yes        Social History     Tobacco Use     Smoking status: Never Smoker     Smokeless tobacco: Never Used   Substance Use Topics     Alcohol use: No     Alcohol/week: 0.0 standard drinks         Alcohol Use 6/20/2022   Prescreen: >3 drinks/day or >7 drinks/week? Not Applicable   Prescreen: >3 drinks/day or >7 drinks/week? -       Reviewed orders with patient.  Reviewed health maintenance and updated orders accordingly - Yes  Lab work is in process  Labs reviewed in EPIC  BP Readings from Last 3 Encounters:   06/20/22 117/76   09/01/21 112/80   07/21/20 110/80    Wt Readings from Last 3 Encounters:    06/20/22 67.6 kg (149 lb)   09/01/21 68.9 kg (152 lb)   07/21/20 67.9 kg (149 lb 9.6 oz)                  Patient Active Problem List   Diagnosis     CARDIOVASCULAR SCREENING; LDL GOAL LESS THAN 160     H/O hyperthyroidism     Overweight     History of gestational diabetes     Past Surgical History:   Procedure Laterality Date     NO HISTORY OF SURGERY         Social History     Tobacco Use     Smoking status: Never Smoker     Smokeless tobacco: Never Used   Substance Use Topics     Alcohol use: No     Alcohol/week: 0.0 standard drinks     Family History   Problem Relation Age of Onset     Diabetes Mother      Cardiovascular Mother         unknown      Thyroid Disease Mother      Diabetes Father      Family History Negative Sister      Family History Negative Brother      Family History Negative Son      Family History Negative Son      Family History Negative Daughter      Eye Disorder Paternal Grandmother          Current Outpatient Medications   Medication Sig Dispense Refill     levonorgestrel-ethinyl estradiol (KURVELO) 0.15-30 MG-MCG tablet Take 1 tablet by mouth daily 84 tablet 3     multivitamin, therapeutic (THERA-VIT) TABS tablet Take 1 tablet by mouth daily       No Known Allergies  Recent Labs   Lab Test 09/01/21  1546 07/25/16  0911 06/03/14  1452   A1C 5.7*  --   --    LDL  --  84 78   HDL  --  49* 42*   TRIG  --  81 133   ALT 37 24  --    CR 0.73 0.69  --    GFRESTIMATED >90 >90  Non African American GFR Calc    --    GFRESTBLACK  --  >90   GFR Calc    --    POTASSIUM 3.6 4.2  --    TSH 1.23 1.26 1.04        Breast Cancer Screening:    Breast CA Risk Assessment (FHS-7) 9/1/2021   Do you have a family history of breast, colon, or ovarian cancer? No / Unknown         Patient under 40 years of age: Routine Mammogram Screening not recommended.   Pertinent mammograms are reviewed under the imaging tab.    History of abnormal Pap smear: NO - age 30-65 PAP every 5 years with negative HPV  "co-testing recommended  PAP / HPV Latest Ref Rng & Units 7/22/2016 4/11/2013 3/29/2012   PAP (Historical) - NIL NIL NIL   HPV16 NEG Negative - -   HPV18 NEG Negative - -   HRHPV NEG Negative - -     Reviewed and updated as needed this visit by clinical staff   Tobacco  Allergies                 Reviewed and updated as needed this visit by Provider                   Past Medical History:   Diagnosis Date     Acute reaction to stress 12/14/2006    Problem list name updated by automated process. Provider to review     H/O hyperthyroidism 4/16/2013    post partum      Postpartum depression 6/23/2011      Past Surgical History:   Procedure Laterality Date     NO HISTORY OF SURGERY         Review of Systems   Constitutional: Negative for chills and fever.   HENT: Negative for congestion, ear pain, hearing loss and sore throat.    Eyes: Negative for pain and visual disturbance.   Respiratory: Negative for cough and shortness of breath.    Cardiovascular: Negative for chest pain, palpitations and peripheral edema.   Gastrointestinal: Negative for abdominal pain, constipation, diarrhea, heartburn, hematochezia and nausea.   Breasts:  Positive for breast mass. Negative for tenderness.   Genitourinary: Negative for dysuria, frequency, genital sores, hematuria, pelvic pain, urgency, vaginal bleeding and vaginal discharge.   Musculoskeletal: Negative for arthralgias, joint swelling and myalgias.   Skin: Negative for rash.   Neurological: Negative for dizziness, weakness, headaches and paresthesias.   Psychiatric/Behavioral: Negative for mood changes. The patient is not nervous/anxious.           OBJECTIVE:   /76 (BP Location: Right arm, Patient Position: Chair, Cuff Size: Adult Regular)   Pulse 85   Temp 98.2  F (36.8  C) (Oral)   Ht 1.6 m (5' 3\")   Wt 67.6 kg (149 lb)   LMP 06/06/2022 (Approximate)   SpO2 99%   BMI 26.39 kg/m    Physical Exam  GENERAL: healthy, alert and no distress  EYES: Eyes grossly normal to " inspection, PERRL and conjunctivae and sclerae normal  HENT: ear canals and TM's normal, nose and mouth without ulcers or lesions  NECK: no adenopathy, no asymmetry, masses, or scars and thyroid normal to palpation  RESP: lungs clear to auscultation - no rales, rhonchi or wheezes  BREAST: right inner medial breast has questionable mass versus underlying bone around 2-3 o'clock position, slightly tender to palpation.  CV: regular rate and rhythm, normal S1 S2, no S3 or S4, no murmur, click or rub, no peripheral edema and peripheral pulses strong  ABDOMEN: soft, nontender, no hepatosplenomegaly, no masses and bowel sounds normal  MS: no gross musculoskeletal defects noted, no edema  SKIN: no suspicious lesions or rashes  NEURO: Normal strength and tone, mentation intact and speech normal  PSYCH: mentation appears normal, affect normal/bright    Diagnostic Test Results:  Labs reviewed in Epic    ASSESSMENT/PLAN:   (Z00.00) Routine general medical examination at a health care facility  (primary encounter diagnosis)  Comment: Exam completed today, routine health maintenance items updated as able.  Labs ordered.  Follow up one year or sooner as needed.    (Z30.41) Encounter for surveillance of contraceptive pills  Comment: Stable, refill medications.   Plan: levonorgestrel-ethinyl estradiol (KURVELO)         0.15-30 MG-MCG tablet            (N64.4) Breast pain, right  Comment: Encouraged patient to complete imaging as this is a continued issue that has not resolved.  Orders placed.  Plan: US Breast Right Limited 1-3 Quadrants, MA         Diagnostic Digital Right            (R73.03) Prediabetes  Comment: Due for labs, recommendation pending results.  Plan: Hemoglobin A1c            (Z86.39) H/O hyperthyroidism  Comment: Due for labs, recommendations pending results.  Plan: TSH with free T4 reflex            (Z12.4) Cervical cancer screening  Comment: Declined  Plan: schedule Pap only visit    Patient has been advised of  "split billing requirements and indicates understanding: Yes    COUNSELING:  Reviewed preventive health counseling, as reflected in patient instructions    Estimated body mass index is 26.39 kg/m  as calculated from the following:    Height as of this encounter: 1.6 m (5' 3\").    Weight as of this encounter: 67.6 kg (149 lb).    Weight management plan: Discussed healthy diet and exercise guidelines    She reports that she has never smoked. She has never used smokeless tobacco.      Counseling Resources:  ATP IV Guidelines  Pooled Cohorts Equation Calculator  Breast Cancer Risk Calculator  BRCA-Related Cancer Risk Assessment: FHS-7 Tool  FRAX Risk Assessment  ICSI Preventive Guidelines  Dietary Guidelines for Americans, 2010  USDA's MyPlate  ASA Prophylaxis  Lung CA Screening    April J. Charles Ring MD  [facility]"

## 2022-06-21 LAB — TSH SERPL DL<=0.005 MIU/L-ACNC: 1.12 MU/L (ref 0.4–4)

## 2022-07-06 ENCOUNTER — HOSPITAL ENCOUNTER (OUTPATIENT)
Dept: MAMMOGRAPHY | Facility: CLINIC | Age: 37
Discharge: HOME | End: 2022-07-06
Attending: FAMILY MEDICINE
Payer: COMMERCIAL

## 2022-07-06 ENCOUNTER — HOSPITAL ENCOUNTER (OUTPATIENT)
Dept: ULTRASOUND IMAGING | Facility: CLINIC | Age: 37
Discharge: HOME | End: 2022-07-06
Attending: FAMILY MEDICINE
Payer: COMMERCIAL

## 2022-07-06 DIAGNOSIS — N64.4 BREAST PAIN, RIGHT: ICD-10-CM

## 2022-07-06 PROCEDURE — 76642 ULTRASOUND BREAST LIMITED: CPT | Mod: RT

## 2022-07-06 PROCEDURE — 77066 DX MAMMO INCL CAD BI: CPT

## 2022-12-26 ENCOUNTER — HEALTH MAINTENANCE LETTER (OUTPATIENT)
Age: 37
End: 2022-12-26

## 2023-01-17 ENCOUNTER — MYC MEDICAL ADVICE (OUTPATIENT)
Dept: FAMILY MEDICINE | Facility: CLINIC | Age: 38
End: 2023-01-17
Payer: COMMERCIAL

## 2023-05-19 ENCOUNTER — MYC MEDICAL ADVICE (OUTPATIENT)
Dept: FAMILY MEDICINE | Facility: CLINIC | Age: 38
End: 2023-05-19
Payer: COMMERCIAL

## 2023-05-19 DIAGNOSIS — Z30.41 ENCOUNTER FOR SURVEILLANCE OF CONTRACEPTIVE PILLS: ICD-10-CM

## 2023-05-22 RX ORDER — LEVONORGESTREL AND ETHINYL ESTRADIOL 0.15-0.03
1 KIT ORAL DAILY
Qty: 84 TABLET | Refills: 0 | Status: SHIPPED | OUTPATIENT
Start: 2023-05-22 | End: 2023-07-18 | Stop reason: ALTCHOICE

## 2023-05-22 NOTE — TELEPHONE ENCOUNTER
Prescription approved per [facility] Refill Protocol.  JOSHUA refill. Further refills at upcoming appointment.  Rehana Burk RN

## 2023-06-16 ENCOUNTER — MYC REFILL (OUTPATIENT)
Dept: FAMILY MEDICINE | Facility: CLINIC | Age: 38
End: 2023-06-16
Payer: COMMERCIAL

## 2023-06-16 DIAGNOSIS — Z30.41 ENCOUNTER FOR SURVEILLANCE OF CONTRACEPTIVE PILLS: ICD-10-CM

## 2023-06-16 RX ORDER — LEVONORGESTREL AND ETHINYL ESTRADIOL 0.15-0.03
1 KIT ORAL DAILY
Qty: 84 TABLET | Refills: 0 | Status: CANCELLED | OUTPATIENT
Start: 2023-06-16

## 2023-07-18 ENCOUNTER — OFFICE VISIT (OUTPATIENT)
Dept: FAMILY MEDICINE | Facility: CLINIC | Age: 38
End: 2023-07-18
Payer: COMMERCIAL

## 2023-07-18 VITALS
BODY MASS INDEX: 25.46 KG/M2 | HEART RATE: 96 BPM | WEIGHT: 143.7 LBS | RESPIRATION RATE: 16 BRPM | DIASTOLIC BLOOD PRESSURE: 84 MMHG | HEIGHT: 63 IN | OXYGEN SATURATION: 100 % | SYSTOLIC BLOOD PRESSURE: 127 MMHG | TEMPERATURE: 98.2 F

## 2023-07-18 DIAGNOSIS — Z86.39 H/O HYPERTHYROIDISM: ICD-10-CM

## 2023-07-18 DIAGNOSIS — R73.03 PREDIABETES: ICD-10-CM

## 2023-07-18 DIAGNOSIS — Z23 ENCOUNTER FOR IMMUNIZATION: ICD-10-CM

## 2023-07-18 DIAGNOSIS — Z00.00 ROUTINE GENERAL MEDICAL EXAMINATION AT A HEALTH CARE FACILITY: Primary | ICD-10-CM

## 2023-07-18 DIAGNOSIS — Z12.31 VISIT FOR SCREENING MAMMOGRAM: ICD-10-CM

## 2023-07-18 DIAGNOSIS — Z12.4 CERVICAL CANCER SCREENING: ICD-10-CM

## 2023-07-18 DIAGNOSIS — Z30.41 ENCOUNTER FOR SURVEILLANCE OF CONTRACEPTIVE PILLS: ICD-10-CM

## 2023-07-18 LAB
ERYTHROCYTE [DISTWIDTH] IN BLOOD BY AUTOMATED COUNT: 12.8 % (ref 10–15)
HBA1C MFR BLD: 5.9 % (ref 0–5.6)
HCT VFR BLD AUTO: 43.6 % (ref 35–47)
HGB BLD-MCNC: 14.4 G/DL (ref 11.7–15.7)
MCH RBC QN AUTO: 28.9 PG (ref 26.5–33)
MCHC RBC AUTO-ENTMCNC: 33 G/DL (ref 31.5–36.5)
MCV RBC AUTO: 88 FL (ref 78–100)
PLATELET # BLD AUTO: 292 10E3/UL (ref 150–450)
RBC # BLD AUTO: 4.98 10E6/UL (ref 3.8–5.2)
WBC # BLD AUTO: 7.6 10E3/UL (ref 4–11)

## 2023-07-18 PROCEDURE — 80053 COMPREHEN METABOLIC PANEL: CPT | Performed by: FAMILY MEDICINE

## 2023-07-18 PROCEDURE — 85027 COMPLETE CBC AUTOMATED: CPT | Performed by: FAMILY MEDICINE

## 2023-07-18 PROCEDURE — 82306 VITAMIN D 25 HYDROXY: CPT | Performed by: FAMILY MEDICINE

## 2023-07-18 PROCEDURE — G0145 SCR C/V CYTO,THINLAYER,RESCR: HCPCS | Performed by: FAMILY MEDICINE

## 2023-07-18 PROCEDURE — 83036 HEMOGLOBIN GLYCOSYLATED A1C: CPT | Performed by: FAMILY MEDICINE

## 2023-07-18 PROCEDURE — 99396 PREV VISIT EST AGE 40-64: CPT | Mod: 25 | Performed by: FAMILY MEDICINE

## 2023-07-18 PROCEDURE — 36415 COLL VENOUS BLD VENIPUNCTURE: CPT | Performed by: FAMILY MEDICINE

## 2023-07-18 PROCEDURE — 90471 IMMUNIZATION ADMIN: CPT | Performed by: FAMILY MEDICINE

## 2023-07-18 PROCEDURE — 87624 HPV HI-RISK TYP POOLED RSLT: CPT | Performed by: FAMILY MEDICINE

## 2023-07-18 PROCEDURE — 90715 TDAP VACCINE 7 YRS/> IM: CPT | Performed by: FAMILY MEDICINE

## 2023-07-18 PROCEDURE — 84443 ASSAY THYROID STIM HORMONE: CPT | Performed by: FAMILY MEDICINE

## 2023-07-18 RX ORDER — LEVONORGESTREL/ETHIN.ESTRADIOL 0.1-0.02MG
1 TABLET ORAL DAILY
Qty: 84 TABLET | Refills: 3 | Status: SHIPPED | OUTPATIENT
Start: 2023-07-18 | End: 2024-01-31

## 2023-07-18 SDOH — ECONOMIC STABILITY: TRANSPORTATION INSECURITY
IN THE PAST 12 MONTHS, HAS THE LACK OF TRANSPORTATION KEPT YOU FROM MEDICAL APPOINTMENTS OR FROM GETTING MEDICATIONS?: PATIENT DECLINED

## 2023-07-18 SDOH — ECONOMIC STABILITY: FOOD INSECURITY: WITHIN THE PAST 12 MONTHS, THE FOOD YOU BOUGHT JUST DIDN'T LAST AND YOU DIDN'T HAVE MONEY TO GET MORE.: NEVER TRUE

## 2023-07-18 SDOH — ECONOMIC STABILITY: FOOD INSECURITY: WITHIN THE PAST 12 MONTHS, YOU WORRIED THAT YOUR FOOD WOULD RUN OUT BEFORE YOU GOT MONEY TO BUY MORE.: NEVER TRUE

## 2023-07-18 SDOH — ECONOMIC STABILITY: INCOME INSECURITY: IN THE LAST 12 MONTHS, WAS THERE A TIME WHEN YOU WERE NOT ABLE TO PAY THE MORTGAGE OR RENT ON TIME?: PATIENT REFUSED

## 2023-07-18 SDOH — ECONOMIC STABILITY: INCOME INSECURITY: HOW HARD IS IT FOR YOU TO PAY FOR THE VERY BASICS LIKE FOOD, HOUSING, MEDICAL CARE, AND HEATING?: PATIENT DECLINED

## 2023-07-18 SDOH — HEALTH STABILITY: PHYSICAL HEALTH: ON AVERAGE, HOW MANY MINUTES DO YOU ENGAGE IN EXERCISE AT THIS LEVEL?: 10 MIN

## 2023-07-18 SDOH — ECONOMIC STABILITY: TRANSPORTATION INSECURITY
IN THE PAST 12 MONTHS, HAS LACK OF TRANSPORTATION KEPT YOU FROM MEETINGS, WORK, OR FROM GETTING THINGS NEEDED FOR DAILY LIVING?: NO

## 2023-07-18 SDOH — HEALTH STABILITY: PHYSICAL HEALTH: ON AVERAGE, HOW MANY DAYS PER WEEK DO YOU ENGAGE IN MODERATE TO STRENUOUS EXERCISE (LIKE A BRISK WALK)?: 2 DAYS

## 2023-07-18 ASSESSMENT — LIFESTYLE VARIABLES
HOW OFTEN DO YOU HAVE SIX OR MORE DRINKS ON ONE OCCASION: NEVER
HOW MANY STANDARD DRINKS CONTAINING ALCOHOL DO YOU HAVE ON A TYPICAL DAY: PATIENT DOES NOT DRINK
SKIP TO QUESTIONS 9-10: 1
AUDIT-C TOTAL SCORE: 0
HOW OFTEN DO YOU HAVE A DRINK CONTAINING ALCOHOL: NEVER

## 2023-07-18 ASSESSMENT — ENCOUNTER SYMPTOMS
CONSTIPATION: 0
FREQUENCY: 0
CHILLS: 0
DYSURIA: 0
PALPITATIONS: 0
HEARTBURN: 0
SHORTNESS OF BREATH: 0
NAUSEA: 0
MYALGIAS: 0
DIARRHEA: 0
EYE PAIN: 0
COUGH: 0
JOINT SWELLING: 0
DIZZINESS: 0
HEMATURIA: 0
ABDOMINAL PAIN: 0
WEAKNESS: 0
SORE THROAT: 0
HEMATOCHEZIA: 0
ARTHRALGIAS: 0
FEVER: 0
NERVOUS/ANXIOUS: 0
PARESTHESIAS: 0
HEADACHES: 0

## 2023-07-18 ASSESSMENT — SOCIAL DETERMINANTS OF HEALTH (SDOH)
DO YOU BELONG TO ANY CLUBS OR ORGANIZATIONS SUCH AS CHURCH GROUPS UNIONS, FRATERNAL OR ATHLETIC GROUPS, OR SCHOOL GROUPS?: NO
IN A TYPICAL WEEK, HOW MANY TIMES DO YOU TALK ON THE PHONE WITH FAMILY, FRIENDS, OR NEIGHBORS?: MORE THAN THREE TIMES A WEEK
HOW OFTEN DO YOU GET TOGETHER WITH FRIENDS OR RELATIVES?: ONCE A WEEK
HOW OFTEN DO YOU ATTEND CHURCH OR RELIGIOUS SERVICES?: PATIENT DECLINED

## 2023-07-18 NOTE — PROGRESS NOTES
SUBJECTIVE:   CC: Keara is an 38 year old who presents for preventive health visit.       7/18/2023     3:25 PM   Additional Questions   Roomed by Virgen Lopez     Here for wellness visit.    Wondering about estrogen free birth control, tried IUD but did not like it, so it was removed.  She would like a lower dose of estrogen if needed.    During the morning her stools get watery and loose. Three times per day, every day, no nausea or vomiting.  Says the stools look dark and oily.  No abdominal pain.      Healthy Habits:     Getting at least 3 servings of Calcium per day:  Yes    Bi-annual eye exam:  NO    Dental care twice a year:  Yes    Sleep apnea or symptoms of sleep apnea:  None    Diet:  Regular (no restrictions)    Frequency of exercise:  1 day/week    Duration of exercise:  15-30 minutes    Taking medications regularly:  Yes    Medication side effects:  None    Additional concerns today:  No      Today's PHQ-2 Score:       7/18/2023     3:19 PM   PHQ-2 ( 1999 Pfizer)   Q1: Little interest or pleasure in doing things 0   Q2: Feeling down, depressed or hopeless 0   PHQ-2 Score 0   Q1: Little interest or pleasure in doing things Not at all   Q2: Feeling down, depressed or hopeless Not at all   PHQ-2 Score 0       Social History     Tobacco Use     Smoking status: Never     Smokeless tobacco: Never   Substance Use Topics     Alcohol use: No     Alcohol/week: 0.0 standard drinks of alcohol             7/18/2023     3:18 PM   Alcohol Use   Prescreen: >3 drinks/day or >7 drinks/week? Not Applicable     Reviewed orders with patient.  Reviewed health maintenance and updated orders accordingly - Yes  Lab work is in process  Labs reviewed in EPIC  BP Readings from Last 3 Encounters:   07/18/23 127/84   06/20/22 117/76   09/01/21 112/80    Wt Readings from Last 3 Encounters:   07/18/23 65.2 kg (143 lb 11.2 oz)   06/20/22 67.6 kg (149 lb)   09/01/21 68.9 kg (152 lb)                  Patient Active Problem List    Diagnosis     H/O hyperthyroidism     Overweight     History of gestational diabetes     Past Surgical History:   Procedure Laterality Date     NO HISTORY OF SURGERY         Social History     Tobacco Use     Smoking status: Never     Smokeless tobacco: Never   Substance Use Topics     Alcohol use: No     Alcohol/week: 0.0 standard drinks of alcohol     Family History   Problem Relation Age of Onset     Diabetes Mother      Cardiovascular Mother         unknown      Thyroid Disease Mother      Diabetes Father      Family History Negative Sister      Family History Negative Brother      Family History Negative Son      Family History Negative Son      Family History Negative Daughter      Eye Disorder Paternal Grandmother          Current Outpatient Medications   Medication Sig Dispense Refill     levonorgestrel-ethinyl estradiol (AVIANE) 0.1-20 MG-MCG tablet Take 1 tablet by mouth daily 84 tablet 3     multivitamin, therapeutic (THERA-VIT) TABS tablet Take 1 tablet by mouth daily       No Known Allergies  Recent Labs   Lab Test 23  1618 22  1706 22  1658 21  1546 21  1546 16  0911   A1C 5.9*  --  5.8*  --  5.7*  --    LDL  --   --   --   --   --  84   HDL  --   --   --   --   --  49*   TRIG  --   --   --   --   --  81   ALT 23  --   --   --  37 24   CR 0.65  --   --   --  0.73 0.69   GFRESTIMATED >90  --   --   --  >90 >90  Non African American GFR Calc     GFRESTBLACK  --   --   --   --   --  >90  African American GFR Calc     POTASSIUM 3.8  --   --   --  3.6 4.2   TSH 1.08 1.12  --    < > 1.23 1.26    < > = values in this interval not displayed.        Breast Cancer Screenin/1/2021     2:46 PM   Breast CA Risk Assessment (FHS-7)   Do you have a family history of breast, colon, or ovarian cancer? No / Unknown         Mammogram Screening - Offered annual screening and updated Health Maintenance for mutual plan based on risk factor consideration    Pertinent mammograms  are reviewed under the imaging tab.    History of abnormal Pap smear: NO - age 30-65 PAP every 5 years with negative HPV co-testing recommended      Latest Ref Rng & Units 7/18/2023     3:48 PM 7/22/2016     4:30 PM 7/22/2016    12:00 AM   PAP / HPV   PAP  Negative for Intraepithelial Lesion or Malignancy (NILM)      PAP (Historical)    NIL    HPV 16 DNA Negative Negative  Negative     HPV 18 DNA Negative Negative  Negative     Other HR HPV Negative Negative  Negative       Reviewed and updated as needed this visit by clinical staff   Tobacco  Allergies  Meds              Reviewed and updated as needed this visit by Provider     Meds             Past Medical History:   Diagnosis Date     Acute reaction to stress 12/14/2006    Problem list name updated by automated process. Provider to review     H/O hyperthyroidism 4/16/2013    post partum      Postpartum depression 6/23/2011      Past Surgical History:   Procedure Laterality Date     NO HISTORY OF SURGERY         Review of Systems   Constitutional: Negative for chills and fever.   HENT: Negative for congestion, ear pain, hearing loss and sore throat.    Eyes: Negative for pain and visual disturbance.   Respiratory: Negative for cough and shortness of breath.    Cardiovascular: Negative for chest pain, palpitations and peripheral edema.   Gastrointestinal: Negative for abdominal pain, constipation, diarrhea, heartburn, hematochezia and nausea.   Genitourinary: Negative for dysuria, frequency, genital sores, hematuria and urgency.   Musculoskeletal: Negative for arthralgias, joint swelling and myalgias.   Skin: Negative for rash.   Neurological: Negative for dizziness, weakness, headaches and paresthesias.   Psychiatric/Behavioral: Negative for mood changes. The patient is not nervous/anxious.           OBJECTIVE:   /84 (BP Location: Right arm, Patient Position: Sitting, Cuff Size: Adult Regular)   Pulse 96   Temp 98.2  F (36.8  C) (Oral)   Resp 16   Ht  "1.6 m (5' 3\")   Wt 65.2 kg (143 lb 11.2 oz)   LMP 06/27/2023 (Approximate)   SpO2 100%   BMI 25.46 kg/m    Physical Exam  GENERAL: healthy, alert and no distress  EYES: Eyes grossly normal to inspection, PERRL and conjunctivae and sclerae normal  HENT: ear canals and TM's normal, nose and mouth without ulcers or lesions  NECK: no adenopathy, no asymmetry, masses, or scars and thyroid normal to palpation  RESP: lungs clear to auscultation - no rales, rhonchi or wheezes  BREAST: normal without masses, tenderness or nipple discharge and no palpable axillary masses or adenopathy  CV: regular rate and rhythm, normal S1 S2, no S3 or S4, no murmur, click or rub, no peripheral edema and peripheral pulses strong  ABDOMEN: soft, nontender, no hepatosplenomegaly, no masses and bowel sounds normal   (female): normal female external genitalia, normal urethral meatus, vaginal mucosa pink, moist, well rugated, and normal cervix/adnexa/uterus without masses or discharge  MS: no gross musculoskeletal defects noted, no edema  SKIN: no suspicious lesions or rashes  NEURO: Normal strength and tone, mentation intact and speech normal  PSYCH: mentation appears normal, affect normal/bright    Diagnostic Test Results:  Labs reviewed in Epic    ASSESSMENT/PLAN:       ICD-10-CM    1. Routine general medical examination at a health care facility  Z00.00 Comprehensive metabolic panel (BMP + Alb, Alk Phos, ALT, AST, Total. Bili, TP)     CBC with platelets     Vitamin D Deficiency     Comprehensive metabolic panel (BMP + Alb, Alk Phos, ALT, AST, Total. Bili, TP)     CBC with platelets     Vitamin D Deficiency      2. Encounter for surveillance of contraceptive pills  Z30.41 levonorgestrel-ethinyl estradiol (AVIANE) 0.1-20 MG-MCG tablet      3. Prediabetes  R73.03 Hemoglobin A1c     Hemoglobin A1c      4. H/O hyperthyroidism  Z86.39 TSH with free T4 reflex     TSH with free T4 reflex      5. Cervical cancer screening  Z12.4 Pap screen with " HPV - recommended age 30 - 65 years     HPV Hold (Lab Only)     HPV High Risk Types DNA Cervical      6. Visit for screening mammogram  Z12.31 MA Screen Bilateral w/Jordy      7. Encounter for immunization  Z23 TDAP VACCINE (Adacel, Boostrix)          Patient has been advised of split billing requirements and indicates understanding: Yes      COUNSELING:  Reviewed preventive health counseling, as reflected in patient instructions        She reports that she has never smoked. She has never used smokeless tobacco.      Eri Ring MD  [facility]

## 2023-07-19 LAB
ALBUMIN SERPL BCG-MCNC: 4.8 G/DL (ref 3.5–5.2)
ALP SERPL-CCNC: 56 U/L (ref 35–104)
ALT SERPL W P-5'-P-CCNC: 23 U/L (ref 0–50)
ANION GAP SERPL CALCULATED.3IONS-SCNC: 16 MMOL/L (ref 7–15)
AST SERPL W P-5'-P-CCNC: 26 U/L (ref 0–45)
BILIRUB SERPL-MCNC: 0.2 MG/DL
BUN SERPL-MCNC: 8 MG/DL (ref 6–20)
CALCIUM SERPL-MCNC: 9.9 MG/DL (ref 8.6–10)
CHLORIDE SERPL-SCNC: 105 MMOL/L (ref 98–107)
CREAT SERPL-MCNC: 0.65 MG/DL (ref 0.51–0.95)
DEPRECATED CALCIDIOL+CALCIFEROL SERPL-MC: 43 UG/L (ref 20–75)
DEPRECATED HCO3 PLAS-SCNC: 20 MMOL/L (ref 22–29)
GFR SERPL CREATININE-BSD FRML MDRD: >90 ML/MIN/1.73M2
GLUCOSE SERPL-MCNC: 108 MG/DL (ref 70–99)
POTASSIUM SERPL-SCNC: 3.8 MMOL/L (ref 3.4–5.3)
PROT SERPL-MCNC: 7.8 G/DL (ref 6.4–8.3)
SODIUM SERPL-SCNC: 141 MMOL/L (ref 136–145)
TSH SERPL DL<=0.005 MIU/L-ACNC: 1.08 UIU/ML (ref 0.3–4.2)

## 2023-07-22 LAB
BKR LAB AP GYN ADEQUACY: NORMAL
BKR LAB AP GYN INTERPRETATION: NORMAL
BKR LAB AP HPV REFLEX: NORMAL
BKR LAB AP LMP: NORMAL
BKR LAB AP PREVIOUS ABNORMAL: NORMAL
PATH REPORT.COMMENTS IMP SPEC: NORMAL
PATH REPORT.COMMENTS IMP SPEC: NORMAL
PATH REPORT.RELEVANT HX SPEC: NORMAL

## 2023-07-26 LAB
HUMAN PAPILLOMA VIRUS 16 DNA: NEGATIVE
HUMAN PAPILLOMA VIRUS 18 DNA: NEGATIVE
HUMAN PAPILLOMA VIRUS FINAL DIAGNOSIS: NORMAL
HUMAN PAPILLOMA VIRUS OTHER HR: NEGATIVE

## 2024-01-31 ENCOUNTER — NURSE TRIAGE (OUTPATIENT)
Dept: FAMILY MEDICINE | Facility: CLINIC | Age: 39
End: 2024-01-31

## 2024-01-31 ENCOUNTER — OFFICE VISIT (OUTPATIENT)
Dept: PEDIATRICS | Facility: CLINIC | Age: 39
End: 2024-01-31
Payer: COMMERCIAL

## 2024-01-31 VITALS
RESPIRATION RATE: 20 BRPM | DIASTOLIC BLOOD PRESSURE: 80 MMHG | SYSTOLIC BLOOD PRESSURE: 110 MMHG | WEIGHT: 145 LBS | HEIGHT: 63 IN | OXYGEN SATURATION: 100 % | TEMPERATURE: 97.5 F | BODY MASS INDEX: 25.69 KG/M2 | HEART RATE: 120 BPM

## 2024-01-31 DIAGNOSIS — R35.0 URINARY FREQUENCY: ICD-10-CM

## 2024-01-31 DIAGNOSIS — R30.0 DYSURIA: ICD-10-CM

## 2024-01-31 DIAGNOSIS — Z30.41 ENCOUNTER FOR SURVEILLANCE OF CONTRACEPTIVE PILLS: ICD-10-CM

## 2024-01-31 DIAGNOSIS — N39.0 ACUTE UTI (URINARY TRACT INFECTION): Primary | ICD-10-CM

## 2024-01-31 LAB
ALBUMIN UR-MCNC: 30 MG/DL
APPEARANCE UR: ABNORMAL
BACTERIA #/AREA URNS HPF: ABNORMAL /HPF
BILIRUB UR QL STRIP: NEGATIVE
COLOR UR AUTO: YELLOW
GLUCOSE UR STRIP-MCNC: NEGATIVE MG/DL
HGB UR QL STRIP: ABNORMAL
KETONES UR STRIP-MCNC: NEGATIVE MG/DL
LEUKOCYTE ESTERASE UR QL STRIP: ABNORMAL
NITRATE UR QL: NEGATIVE
PH UR STRIP: 6.5 [PH] (ref 5–7)
RBC #/AREA URNS AUTO: ABNORMAL /HPF
SP GR UR STRIP: <=1.005 (ref 1–1.03)
SQUAMOUS #/AREA URNS AUTO: ABNORMAL /LPF
UROBILINOGEN UR STRIP-ACNC: 0.2 E.U./DL
WBC #/AREA URNS AUTO: >100 /HPF
WBC CLUMPS #/AREA URNS HPF: PRESENT /HPF

## 2024-01-31 PROCEDURE — 99213 OFFICE O/P EST LOW 20 MIN: CPT | Mod: GC

## 2024-01-31 PROCEDURE — 87186 SC STD MICRODIL/AGAR DIL: CPT

## 2024-01-31 PROCEDURE — 81001 URINALYSIS AUTO W/SCOPE: CPT

## 2024-01-31 PROCEDURE — 87086 URINE CULTURE/COLONY COUNT: CPT

## 2024-01-31 RX ORDER — LEVONORGESTREL/ETHIN.ESTRADIOL 0.1-0.02MG
1 TABLET ORAL DAILY
Qty: 84 TABLET | Refills: 4 | Status: SHIPPED | OUTPATIENT
Start: 2024-01-31

## 2024-01-31 RX ORDER — PHENAZOPYRIDINE HYDROCHLORIDE 95 MG/1
190 TABLET ORAL 3 TIMES DAILY
Qty: 30 TABLET | Refills: 0 | Status: SHIPPED | OUTPATIENT
Start: 2024-01-31 | End: 2024-09-30

## 2024-01-31 RX ORDER — NITROFURANTOIN 25; 75 MG/1; MG/1
100 CAPSULE ORAL 2 TIMES DAILY
Qty: 10 CAPSULE | Refills: 0 | Status: SHIPPED | OUTPATIENT
Start: 2024-01-31 | End: 2024-02-05

## 2024-01-31 NOTE — PATIENT INSTRUCTIONS
It was a pleasure to meet you! For today:     - You have a UTI, we will send you home with antibiotics called Macrobid which you will take 2 times per day for 5 days  - We will watch for the urinary culture and let you know the results  - For the pain, please continue with tylenol, ibuprofen and will send you home with a prescription of Azo to be taken up to three times per day  - We will let you know the results of the urinary studies and lab work  - If you develop fevers, back/worsening abdominal pain, back pain, please call and we can switch your antibiotics or come to be seen by a provider    Urinary Tract Infection (UTI) in Women: Care Instructions  Overview     A urinary tract infection, or UTI, is a general term for an infection anywhere between the kidneys and the urethra (where urine comes out). Most UTIs are bladder infections. They often cause pain or burning when you urinate.  UTIs are caused by bacteria and can be cured with antibiotics. Be sure to complete your treatment so that the infection does not get worse.  Follow-up care is a key part of your treatment and safety. Be sure to make and go to all appointments, and call your doctor if you are having problems. It's also a good idea to know your test results and keep a list of the medicines you take.  How can you care for yourself at home?  Take your antibiotics as directed. Do not stop taking them just because you feel better. You need to take the full course of antibiotics.  Drink extra water and other fluids for the next day or two. This will help make the urine less concentrated and help wash out the bacteria that are causing the infection. (If you have kidney, heart, or liver disease and have to limit fluids, talk with your doctor before you increase the amount of fluids you drink.)  Avoid drinks that are carbonated or have caffeine. They can irritate the bladder.  Urinate often. Try to empty your bladder each time.  To relieve pain, take a hot  "bath or lay a heating pad set on low over your lower belly or genital area. Never go to sleep with a heating pad in place.  To prevent UTIs  Drink plenty of water each day. This helps you urinate often, which clears bacteria from your system. (If you have kidney, heart, or liver disease and have to limit fluids, talk with your doctor before you increase the amount of fluids you drink.)  Urinate when you need to.  If you are sexually active, urinate right after you have sex.  Change sanitary pads often.  Avoid douches, bubble baths, feminine hygiene sprays, and other feminine hygiene products that have deodorants.  After going to the bathroom, wipe from front to back.  When should you call for help?   Call your doctor now or seek immediate medical care if:    Symptoms such as fever, chills, nausea, or vomiting get worse or appear for the first time.     You have new pain in your back just below your rib cage. This is called flank pain.     There is new blood or pus in your urine.     You have any problems with your antibiotic medicine.   Watch closely for changes in your health, and be sure to contact your doctor if:    You are not getting better after taking an antibiotic for 2 days.     Your symptoms go away but then come back.   Where can you learn more?  Go to [email or web address]  Enter K848 in the search box to learn more about \"Urinary Tract Infection (UTI) in Women: Care Instructions.\"  Current as of: February 28, 2023               Content Version: 13.8    5971-1440 [employer].   Care instructions adapted under license by your healthcare professional. If you have questions about a medical condition or this instruction, always ask your healthcare professional. [employer] disclaims any warranty or liability for your use of this information.      "

## 2024-01-31 NOTE — TELEPHONE ENCOUNTER
"Spouse calling stating pt has been having abdominal pain starting at 9pm lastnight and during the night she reports having to urinate every 5 or 10 minutes and states she has pain with urination and is has the feeling of being unable to empty. Still having pain now but she states a little less but rates it a 6 or 7/10.    Location of pain is left lower side of abdomen.    Per protocol needs to be evaluated still today either appointment or ER. Advised will get message to PCP and clinic for availability, otherwise likely UC/ER.    Thanks!  Alirio Bradshaw RN   [facility]      Reason for Disposition   MILD TO MODERATE constant pain lasting > 2 hours    Additional Information   Negative: Passed out (i.e., fainted, collapsed and was not responding)   Negative: Shock suspected (e.g., cold/pale/clammy skin, too weak to stand, low BP, rapid pulse)   Negative: Sounds like a life-threatening emergency to the triager   Negative: Followed an abdomen (stomach) injury   Negative: Chest pain   Negative: Abdominal pain and pregnant < 20 weeks   Negative: Abdominal pain and pregnant 20 or more weeks   Negative: Pain is mainly in upper abdomen (if needed ask: 'is it mainly above the belly button?')   Negative: Abdomen bloating or swelling are main symptoms    Answer Assessment - Initial Assessment Questions  1. LOCATION: \"Where does it hurt?\"       Lower left  2. RADIATION: \"Does the pain shoot anywhere else?\" (e.g., chest, back)      No  3. ONSET: \"When did the pain begin?\" (e.g., minutes, hours or days ago)       Lastnight at 9pm  4. SUDDEN: \"Gradual or sudden onset?\"      Sudden  5. PATTERN \"Does the pain come and go, or is it constant?\"     - If it comes and goes: \"How long does it last?\" \"Do you have pain now?\"      (Note: Comes and goes means the pain is intermittent. It goes away completely between bouts.)     - If constant: \"Is it getting better, staying the same, or getting worse?\"       (Note: Constant means " "the pain never goes away completely; most serious pain is constant and gets worse.)       Constant  6. SEVERITY: \"How bad is the pain?\"  (e.g., Scale 1-10; mild, moderate, or severe)     - MILD (1-3): Doesn't interfere with normal activities, abdomen soft and not tender to touch.      - MODERATE (4-7): Interferes with normal activities or awakens from sleep, abdomen tender to touch.      - SEVERE (8-10): Excruciating pain, doubled over, unable to do any normal activities.        7/10 now  7. RECURRENT SYMPTOM: \"Have you ever had this type of stomach pain before?\" If Yes, ask: \"When was the last time?\" and \"What happened that time?\"       No  8. CAUSE: \"What do you think is causing the stomach pain?\"      Unsure  9. RELIEVING/AGGRAVATING FACTORS: \"What makes it better or worse?\" (e.g., antacids, bending or twisting motion, bowel movement)      Unsure  10. OTHER SYMPTOMS: \"Do you have any other symptoms?\" (e.g., back pain, diarrhea, fever, urination pain, vomiting)        Pain with urination  11. PREGNANCY: \"Is there any chance you are pregnant?\" \"When was your last menstrual period?\"        No, currently on period    Protocols used: Abdominal Pain - Female-A-[place]    "

## 2024-01-31 NOTE — PROGRESS NOTES
"  Assessment & Plan     (N39.0) Acute UTI (urinary tract infection)  (primary encounter diagnosis)  (R30.0) Dysuria  (R35.0) Urinary frequency  Comment: One day of dysuria, urinary frequency, and suprapubic pain at home.  UA with large leukocyte Estrace and greater than 100 WBCs in addition to WBC clumps consistent with acute UTI.  She is tachycardic on exam but looking through previous vitals she does run in the 90s.  No CVA tenderness to suggest pyelonephritis. She currently has her menstrual period and UA with large blood. Lower concern for a kidney stone at this time.  Discussed with patient and her  that this likely represents an acute uncomplicated cystitis.  Will send for 5 days of Macrobid and discussed that if she has worsening pain, flank pain, fevers, chills at home, she should call in and we can switch to ciprofloxacin at pyelonephritis dosing or should be seen again by a provider.  Given her significant dysuria, will send for prescription for Azo as well.  Patient and her  are comfortable with plan and we will follow-up urinary culture results.  Plan:   - nitroFURantoin macrocrystal-monohydrate (MACROBID) 100 MG capsule x5 days  - UA Macroscopic with reflex to Microscopic and Culture, Urine Microscopic Exam, Urine Culture,          - phenazopyridine (AZO URINARY PAIN RELIEF) 95 MG tablet      (Z30.41) Encounter for surveillance of contraceptive pills  Plan: levonorgestrel-ethinyl estradiol (AVIANE)         0.1-20 MG-MCG tablet         BMI  Estimated body mass index is 26.02 kg/m  as calculated from the following:    Height as of this encounter: 1.59 m (5' 2.6\").    Weight as of this encounter: 65.8 kg (145 lb).     Patient seen and staffed with attending physician, Dr. Fuller.     Florian Pacheco MD  Medicine-Pediatrics Resident, PGY1    STAFF NOTE:  I have seen the patient, discussed with the resident, was present during critical portion of visit, and was available to furnish " services throughout the visit.  I agree with the history, physical and plan as documented above.    Ana Cristina Fuller MD  Internal Medicine-Pediatrics      Jeff Sarah is a 39 year old, presenting for the following health issues:  Urinary Pain (Pain/burning/urine constantly/ started last night.)        1/31/2024     2:59 PM   Additional Questions   Roomed by Ani Salazar MA   Accompanied by          1/31/2024     2:59 PM   Patient Reported Additional Medications   Patient reports taking the following new medications n/a     History of Present Illness       Reason for visit:  Urin infection  Symptom onset:  Today    She eats 2-3 servings of fruits and vegetables daily.She consumes 1 sweetened beverage(s) daily.She exercises with enough effort to increase her heart rate 30 to 60 minutes per day.  She exercises with enough effort to increase her heart rate 3 or less days per week.   She is taking medications regularly.     - Abdominal pain since 9 pm last night  - Up to urinate every 5-10 minutes all night long with dysuria  - No previous UTI in the past (No growth on cultures in the past)  - Sudden LLQ Pain rated to 8 yo 8/10  - Couple of drops of urine  - Discharge at the beginning of this  - Currently on period so blood with pee  - No fevers, chills  - No back pain  - No history of kidney stones  - Mild headache today   - No nausea, vomiting  - No constipation, diarrhea  - Burning pain  - Feels similar pain of pregnancy  - Currently on period     Past Medical History:   Diagnosis Date    Acute reaction to stress 12/14/2006    Problem list name updated by automated process. Provider to review    H/O hyperthyroidism 4/16/2013    post partum     Postpartum depression 6/23/2011       Review of Systems  Constitutional, HEENT, cardiovascular, pulmonary, GI, , musculoskeletal, neuro, skin, endocrine and psych systems are negative, except as otherwise noted.      Objective    /80   Pulse 120   Temp  "97.5  F (36.4  C) (Oral)   Resp 20   Ht 1.59 m (5' 2.6\")   Wt 65.8 kg (145 lb)   LMP 01/31/2024   SpO2 100%   BMI 26.02 kg/m    Body mass index is 26.02 kg/m .  Physical Exam   GENERAL: alert and no distress  EYES: Eyes grossly normal to inspection, PERRL and conjunctivae and sclerae normal  HENT: nose and mouth without ulcers or lesions  RESP: lungs clear to auscultation - no rales, rhonchi or wheezes  CV: tachycardic rate and regular rhythm, normal S1 S2, no S3 or S4, no murmur, click or rub, no peripheral edema  ABDOMEN: suprapubic tenderness, no other tenderness. No CVA tenderness. soft, no hepatosplenomegaly, no masses and bowel sounds normal  MS: no gross musculoskeletal defects noted, no edema  SKIN: no suspicious lesions or rashes  NEURO: Normal strength and tone, mentation intact and speech normal  PSYCH: mentation appears normal, affect normal/bright    UA with large leukocyte esterase and >100 WBCs        Signed Electronically by: Florian Casillas MD    "

## 2024-02-01 LAB — BACTERIA UR CULT: ABNORMAL

## 2024-02-08 ENCOUNTER — TELEPHONE (OUTPATIENT)
Dept: FAMILY MEDICINE | Facility: CLINIC | Age: 39
End: 2024-02-08
Payer: COMMERCIAL

## 2024-02-08 DIAGNOSIS — N30.01 ACUTE CYSTITIS WITH HEMATURIA: Primary | ICD-10-CM

## 2024-02-08 RX ORDER — CIPROFLOXACIN 250 MG/1
250 TABLET, FILM COATED ORAL 2 TIMES DAILY
Qty: 6 TABLET | Refills: 0 | Status: SHIPPED | OUTPATIENT
Start: 2024-02-08 | End: 2024-09-30

## 2024-02-08 NOTE — TELEPHONE ENCOUNTER
Call placed to pt's spouses' home #  LM to return call to the clinic    Devin Castaneda RN on 2/8/2024 at 2:15 PM

## 2024-02-08 NOTE — TELEPHONE ENCOUNTER
Received call from patient and patient's spouse. Patient was seen for OV 1/31/24 for UTI. Patient completed full 5 day course of ABX. Patient had pain with urination, which has resolved. Patient notes she is still having continued pain in her bladder which is constant, rated as 3 or 4/10. Pain in bladder was at 6/10 at time of appointment, now is 3 or 4/10. Pain is the same as yesterday. Drinking a lot of water. Please review and advise if further ABX advised, if continue to monitor for improvement of symptoms, or re-evaluation.     Jimmie BUENO RN 2/8/2024 at 1:11 PM

## 2024-02-08 NOTE — TELEPHONE ENCOUNTER
Let's have her take a course of ciprofloxacin, based on her urine culture results. I would do 250mg twice daily x3 days. This is sent in to listed pharmacy. If symptoms persist after this would have her follow-up in clinic for repeat UA. Please let her know that rarely this medication is associated with tendon pain and rupture, so if she has any MSK pain with medication to stop and call us right away.    Ana Cristina Fuller MD  Internal Medicine-Pediatrics

## 2024-02-09 NOTE — TELEPHONE ENCOUNTER
RN called and spoke with patient and spouse. Relayed provider message. Patient was given an opportunity to ask questions, verbalized understanding of plan, and is agreeable.      America COWAN RN on 2/9/2024 at 8:19 AM

## 2024-06-06 ENCOUNTER — PATIENT OUTREACH (OUTPATIENT)
Dept: CARE COORDINATION | Facility: CLINIC | Age: 39
End: 2024-06-06
Payer: COMMERCIAL

## 2024-06-18 ENCOUNTER — PATIENT OUTREACH (OUTPATIENT)
Dept: CARE COORDINATION | Facility: CLINIC | Age: 39
End: 2024-06-18
Payer: COMMERCIAL

## 2024-07-02 ENCOUNTER — PATIENT OUTREACH (OUTPATIENT)
Dept: CARE COORDINATION | Facility: CLINIC | Age: 39
End: 2024-07-02
Payer: COMMERCIAL

## 2024-07-04 ENCOUNTER — PATIENT OUTREACH (OUTPATIENT)
Dept: CARE COORDINATION | Facility: CLINIC | Age: 39
End: 2024-07-04
Payer: COMMERCIAL

## 2024-08-08 ENCOUNTER — TELEPHONE (OUTPATIENT)
Dept: FAMILY MEDICINE | Facility: CLINIC | Age: 39
End: 2024-08-08
Payer: COMMERCIAL

## 2024-08-08 NOTE — TELEPHONE ENCOUNTER
Reason for Call:  Appointment Request    Patient requesting this type of appt:  Preventive     Requested provider: Eri Ambriz    Reason patient unable to be scheduled: Not within requested timeframe    When does patient want to be seen/preferred time:  Month of September anyday after 3pm    Comments: Patient is requesting to schedule her yearly physical with her primary care provider or sometime in September. Prefers to schedule after 3pm due to work schedule. Willing to schedule with a different female provider if need be.     Could we send this information to you in Send the Trend or would you prefer to receive a phone call?:   No preference   Okay to leave a detailed message?: Yes at Cell number on file:    Telephone Information:   Mobile [phone]       Call taken on 8/8/2024 at 11:27 AM by Paz Marrero

## 2024-09-01 ENCOUNTER — HEALTH MAINTENANCE LETTER (OUTPATIENT)
Age: 39
End: 2024-09-01

## 2024-09-30 ENCOUNTER — OFFICE VISIT (OUTPATIENT)
Dept: FAMILY MEDICINE | Facility: CLINIC | Age: 39
End: 2024-09-30
Payer: COMMERCIAL

## 2024-09-30 VITALS
SYSTOLIC BLOOD PRESSURE: 119 MMHG | OXYGEN SATURATION: 99 % | HEIGHT: 63 IN | TEMPERATURE: 98.5 F | DIASTOLIC BLOOD PRESSURE: 79 MMHG | BODY MASS INDEX: 25.69 KG/M2 | WEIGHT: 145 LBS | RESPIRATION RATE: 19 BRPM | HEART RATE: 88 BPM

## 2024-09-30 DIAGNOSIS — Z86.39 H/O HYPERTHYROIDISM: ICD-10-CM

## 2024-09-30 DIAGNOSIS — R73.03 PRE-DIABETES: ICD-10-CM

## 2024-09-30 DIAGNOSIS — Z00.00 ROUTINE GENERAL MEDICAL EXAMINATION AT A HEALTH CARE FACILITY: Primary | ICD-10-CM

## 2024-09-30 DIAGNOSIS — N63.14 MASS OF LOWER INNER QUADRANT OF RIGHT BREAST: ICD-10-CM

## 2024-09-30 DIAGNOSIS — Z13.220 LIPID SCREENING: ICD-10-CM

## 2024-09-30 DIAGNOSIS — Z86.19 HISTORY OF HELICOBACTER PYLORI INFECTION: ICD-10-CM

## 2024-09-30 DIAGNOSIS — E66.3 OVERWEIGHT: ICD-10-CM

## 2024-09-30 DIAGNOSIS — R10.13 EPIGASTRIC PAIN: ICD-10-CM

## 2024-09-30 DIAGNOSIS — Z23 NEED FOR COVID-19 VACCINE: ICD-10-CM

## 2024-09-30 DIAGNOSIS — Z23 NEEDS FLU SHOT: ICD-10-CM

## 2024-09-30 DIAGNOSIS — Z11.59 NEED FOR HEPATITIS C SCREENING TEST: ICD-10-CM

## 2024-09-30 PROBLEM — Z86.32 HISTORY OF GESTATIONAL DIABETES: Status: RESOLVED | Noted: 2018-05-07 | Resolved: 2024-09-30

## 2024-09-30 LAB
ERYTHROCYTE [DISTWIDTH] IN BLOOD BY AUTOMATED COUNT: 13.1 % (ref 10–15)
HCT VFR BLD AUTO: 40.3 % (ref 35–47)
HGB BLD-MCNC: 13.4 G/DL (ref 11.7–15.7)
MCH RBC QN AUTO: 29.3 PG (ref 26.5–33)
MCHC RBC AUTO-ENTMCNC: 33.3 G/DL (ref 31.5–36.5)
MCV RBC AUTO: 88 FL (ref 78–100)
PLATELET # BLD AUTO: 286 10E3/UL (ref 150–450)
RBC # BLD AUTO: 4.57 10E6/UL (ref 3.8–5.2)
WBC # BLD AUTO: 6.5 10E3/UL (ref 4–11)

## 2024-09-30 PROCEDURE — 90480 ADMN SARSCOV2 VAC 1/ONLY CMP: CPT | Performed by: NURSE PRACTITIONER

## 2024-09-30 PROCEDURE — 86803 HEPATITIS C AB TEST: CPT | Performed by: NURSE PRACTITIONER

## 2024-09-30 PROCEDURE — 90656 IIV3 VACC NO PRSV 0.5 ML IM: CPT | Performed by: NURSE PRACTITIONER

## 2024-09-30 PROCEDURE — 80053 COMPREHEN METABOLIC PANEL: CPT | Performed by: NURSE PRACTITIONER

## 2024-09-30 PROCEDURE — 85027 COMPLETE CBC AUTOMATED: CPT | Performed by: NURSE PRACTITIONER

## 2024-09-30 PROCEDURE — 83013 H PYLORI (C-13) BREATH: CPT | Mod: 90 | Performed by: NURSE PRACTITIONER

## 2024-09-30 PROCEDURE — 80061 LIPID PANEL: CPT | Performed by: NURSE PRACTITIONER

## 2024-09-30 PROCEDURE — 84443 ASSAY THYROID STIM HORMONE: CPT | Performed by: NURSE PRACTITIONER

## 2024-09-30 PROCEDURE — 99213 OFFICE O/P EST LOW 20 MIN: CPT | Mod: 25 | Performed by: NURSE PRACTITIONER

## 2024-09-30 PROCEDURE — 91320 SARSCV2 VAC 30MCG TRS-SUC IM: CPT | Performed by: NURSE PRACTITIONER

## 2024-09-30 PROCEDURE — 99000 SPECIMEN HANDLING OFFICE-LAB: CPT | Performed by: NURSE PRACTITIONER

## 2024-09-30 PROCEDURE — 90471 IMMUNIZATION ADMIN: CPT | Performed by: NURSE PRACTITIONER

## 2024-09-30 PROCEDURE — 36415 COLL VENOUS BLD VENIPUNCTURE: CPT | Performed by: NURSE PRACTITIONER

## 2024-09-30 PROCEDURE — 99459 PELVIC EXAMINATION: CPT | Performed by: NURSE PRACTITIONER

## 2024-09-30 PROCEDURE — 99396 PREV VISIT EST AGE 40-64: CPT | Mod: 57 | Performed by: NURSE PRACTITIONER

## 2024-09-30 RX ORDER — FAMOTIDINE 20 MG/1
20 TABLET, FILM COATED ORAL 2 TIMES DAILY
Qty: 60 TABLET | Refills: 0 | Status: SHIPPED | OUTPATIENT
Start: 2024-09-30

## 2024-09-30 SDOH — HEALTH STABILITY: PHYSICAL HEALTH: ON AVERAGE, HOW MANY DAYS PER WEEK DO YOU ENGAGE IN MODERATE TO STRENUOUS EXERCISE (LIKE A BRISK WALK)?: 2 DAYS

## 2024-09-30 SDOH — HEALTH STABILITY: PHYSICAL HEALTH: ON AVERAGE, HOW MANY MINUTES DO YOU ENGAGE IN EXERCISE AT THIS LEVEL?: 30 MIN

## 2024-09-30 ASSESSMENT — SOCIAL DETERMINANTS OF HEALTH (SDOH): HOW OFTEN DO YOU GET TOGETHER WITH FRIENDS OR RELATIVES?: ONCE A WEEK

## 2024-09-30 NOTE — PATIENT INSTRUCTIONS
Shots and labs today.  Mammogram and ultrasound in near future.        Patient Education   Preventive Care Advice   This is general advice given by our system to help you stay healthy. However, your care team may have specific advice just for you. Please talk to your care team about your preventive care needs.  Nutrition  Eat 5 or more servings of fruits and vegetables each day.  Try wheat bread, brown rice and whole grain pasta (instead of white bread, rice, and pasta).  Get enough calcium and vitamin D. Check the label on foods and aim for 100% of the RDA (recommended daily allowance).  Lifestyle  Exercise at least 150 minutes each week  (30 minutes a day, 5 days a week).  Do muscle strengthening activities 2 days a week. These help control your weight and prevent disease.  No smoking.  Wear sunscreen to prevent skin cancer.  Have a dental exam and cleaning every 6 months.  Yearly exams  See your health care team every year to talk about:  Any changes in your health.  Any medicines your care team has prescribed.  Preventive care, family planning, and ways to prevent chronic diseases.  Shots (vaccines)   HPV shots (up to age 26), if you've never had them before.  Hepatitis B shots (up to age 59), if you've never had them before.  COVID-19 shot: Get this shot when it's due.  Flu shot: Get a flu shot every year.  Tetanus shot: Get a tetanus shot every 10 years.  Pneumococcal, hepatitis A, and RSV shots: Ask your care team if you need these based on your risk.  Shingles shot (for age 50 and up)  General health tests  Diabetes screening:  Starting at age 35, Get screened for diabetes at least every 3 years.  If you are younger than age 35, ask your care team if you should be screened for diabetes.  Cholesterol test: At age 39, start having a cholesterol test every 5 years, or more often if advised.  Bone density scan (DEXA): At age 50, ask your care team if you should have this scan for osteoporosis (brittle  bones).  Hepatitis C: Get tested at least once in your life.  STIs (sexually transmitted infections)  Before age 24: Ask your care team if you should be screened for STIs.  After age 24: Get screened for STIs if you're at risk. You are at risk for STIs (including HIV) if:  You are sexually active with more than one person.  You don't use condoms every time.  You or a partner was diagnosed with a sexually transmitted infection.  If you are at risk for HIV, ask about PrEP medicine to prevent HIV.  Get tested for HIV at least once in your life, whether you are at risk for HIV or not.  Cancer screening tests  Cervical cancer screening: If you have a cervix, begin getting regular cervical cancer screening tests starting at age 21.  Breast cancer scan (mammogram): If you've ever had breasts, begin having regular mammograms starting at age 40. This is a scan to check for breast cancer.  Colon cancer screening: It is important to start screening for colon cancer at age 45.  Have a colonoscopy test every 10 years (or more often if you're at risk) Or, ask your provider about stool tests like a FIT test every year or Cologuard test every 3 years.  To learn more about your testing options, visit:   .  For help making a decision, visit:   https://bit.ly/jk90216.  Prostate cancer screening test: If you have a prostate, ask your care team if a prostate cancer screening test (PSA) at age 55 is right for you.  Lung cancer screening: If you are a current or former smoker ages 50 to 80, ask your care team if ongoing lung cancer screenings are right for you.  For informational purposes only. Not to replace the advice of your health care provider. Copyright   2023 [facility]. All rights reserved. Clinically reviewed by the [facility] Transitions Program. [employer] 076332 - REV 01/24.  Eating Healthy Foods: Care Instructions  With every meal, you can make healthy food choices. Try to eat a variety of fruits,  "vegetables, whole grains, lean proteins, and low-fat dairy products. This can help you get the right balance of nutrients, including vitamins and minerals. Small changes add up over time. You can start by adding one healthy food to your meals each day.    Try to make half your plate fruits and vegetables, one-fourth whole grains, and one-fourth lean proteins. Try including dairy with your meals.   Eat more fruits and vegetables. Try to have them with most meals and snacks.   Foods for healthy eating    Fruits    These can be fresh, frozen, canned, or dried.  Try to choose whole fruit rather than fruit juice.  Eat a variety of colors.    Vegetables    These can be fresh, frozen, canned, or dried.  Beans, peas, and lentils count too.    Whole grains    Choose whole-grain breads, cereals, and noodles.  Try brown rice.    Lean proteins    These can include lean meat, poultry, fish, and eggs.  You can also have tofu, beans, peas, lentils, nuts, and seeds.    Dairy    Try milk, yogurt, and cheese.  Choose low-fat or fat-free when you can.  If you need to, use lactose-free milk or fortified plant-based milk products, such as soy milk.    Water    Drink water when you're thirsty.  Limit sugar-sweetened drinks, including soda, fruit drinks, and sports drinks.  Where can you learn more?  Go to https://www.[employer].net/patiented  Enter T756 in the search box to learn more about \"Eating Healthy Foods: Care Instructions.\"  Current as of: September 20, 2023               Content Version: 14.0    3747-4348 [employer].   Care instructions adapted under license by your healthcare professional. If you have questions about a medical condition or this instruction, always ask your healthcare professional. [employer], [employer] disclaims any warranty or liability for your use of this information.         "

## 2024-09-30 NOTE — PROGRESS NOTES
"Preventive Care Visit  [facility]  SANJAY Damico CNP, Family Medicine  Sep 30, 2024          Assessment & Plan   (Z00.00) Routine general medical examination at a health care facility  (primary encounter diagnosis)  Comment: See patient instructions.   Plan: REVIEW OF HEALTH MAINTENANCE PROTOCOL ORDERS,         IN PELVIC EXAMINATION    (E66.3) Overweight  Comment: Continue to work on diet and exercise.  Walking daily.     (N63.14) Mass of lower inner quadrant of right breast  Comment: Will notify of results and further instructions.  Plan: MA Diagnostic Digital Bilateral, US Breast         Right Limited 1-3 Quadrants    (R73.03) Pre-diabetes  Comment: Continue to work on diet and exercise and weight loss.     (R10.13) Epigastric pain  Comment: Stop current antacid.  Start famotidine 20 mg BID for 2 weeks then recheck Breath test.    Plan: Helicobacter pylori Breath Test, famotidine         (PEPCID) 20 MG tablet, CBC with platelets,         Comprehensive metabolic panel    (Z86.39) H/O hyperthyroidism  Comment: Check labs today.   Plan: TSH with free T4 reflex    (Z86.19) History of Helicobacter pylori infection  Comment: Resolved after treatment.  Recheck in 2 weeks due to recent increase in symptoms.     (Z13.220) Lipid screening  Comment:   Plan: Lipid panel reflex to direct LDL Non-fasting    (Z11.59) Need for hepatitis C screening test  Comment:   Plan: Hepatitis C Screen Reflex to HCV RNA Quant and         Genotype    (Z23) Needs flu shot  Comment:   Plan: INFLUENZA VACCINE,SPLIT         VIRUS,TRIVALENT,PF(FLUZONE)    (Z23) Need for COVID-19 vaccine  Comment:   Plan: COVID-19 12+ (PFIZER)       Patient has been advised of split billing requirements and indicates understanding: Yes      BMI  Estimated body mass index is 26.02 kg/m  as calculated from the following:    Height as of this encounter: 1.59 m (5' 2.6\").    Weight as of this encounter: 65.8 kg (145 lb).   Weight " management plan: Discussed healthy diet and exercise guidelines    Counseling  Appropriate preventive services were addressed with this patient via screening, questionnaire, or discussion as appropriate for fall prevention, nutrition, physical activity, Tobacco-use cessation, social engagement, weight loss and cognition.  Checklist reviewing preventive services available has been given to the patient.  Reviewed patient's diet, addressing concerns and/or questions.   She is at risk for lack of exercise and has been provided with information to increase physical activity for the benefit of her well-being.         Jeff Sarah is a 39 year old, presenting for the following:  Physical (Would like to get her birth control pill refilled )        9/30/2024     3:28 PM   Additional Questions   Roomed by Myke ORELLANA CMA        Via the Health Maintenance questionnaire, the patient has reported the following services have been completed -Mammogram: [facility] 2022-06-02, this information has not been sent to the abstraction team.      HPI:  Sexually active with .  No changes in partners.  LMP:  August 2024; lasts 3 days; lighter flow.  Uses oral BCP for birth control or protection.  No concerns with sexual health today.     Breast pain:  Breast lump of right breast this past month (about 20-30 days).  Had mammogram about 2 years ago and was noted to be okay.  Ultrasound was okay.     Epigastric pain: Epigastric pain for the past 3-4 months.  Would like H. Pylori checked today.  She does share food regularly.  H/O h. Pylori in the past and had treatment.             9/30/2024   General Health   How would you rate your overall physical health? Good   Feel stress (tense, anxious, or unable to sleep) Not at all            9/30/2024   Nutrition   Three or more servings of calcium each day? Yes   Diet: Regular (no restrictions)   How many servings of fruit and vegetables per day? (!) 0-1   How many sweetened  beverages each day? 0-1            9/30/2024   Exercise   Days per week of moderate/strenous exercise 2 days   Average minutes spent exercising at this level 30 min      (!) EXERCISE CONCERN      9/30/2024   Social Factors   Frequency of gathering with friends or relatives Once a week   Worry food won't last until get money to buy more No   Food not last or not have enough money for food? No   Do you have housing? (Housing is defined as stable permanent housing and does not include staying ouside in a car, in a tent, in an abandoned building, in an overnight shelter, or couch-surfing.) Yes   Are you worried about losing your housing? No   Lack of transportation? No   Unable to get utilities (heat,electricity)? No            9/30/2024   Dental   Dentist two times every year? Yes            9/30/2024   TB Screening   Were you born outside of the [place]? Yes            Today's PHQ-2 Score:       1/31/2024     2:47 PM   PHQ-2 ( 1999 Pf[employer])   Q1: Little interest or pleasure in doing things 1   Q2: Feeling down, depressed or hopeless 0   PHQ-2 Score 1   Q1: Little interest or pleasure in doing things Several days   Q2: Feeling down, depressed or hopeless Not at all   PHQ-2 Score 1           9/30/2024   Substance Use   Alcohol more than 3/day or more than 7/wk No   Do you use any other substances recreationally? No        Social History     Tobacco Use    Smoking status: Never    Smokeless tobacco: Never   Vaping Use    Vaping status: Never Used   Substance Use Topics    Alcohol use: No     Alcohol/week: 0.0 standard drinks of alcohol    Drug use: No             7/6/2022   LAST FHS-7 RESULTS   1st degree relative breast or ovarian cancer No   Any relative bilateral breast cancer No   Any male have breast cancer No   Any ONE woman have BOTH breast AND ovarian cancer No   Any woman with breast cancer before 50yrs No   2 or more relatives with breast AND/OR ovarian cancer No   2 or more relatives with breast AND/OR bowel cancer  No           Mammogram Screening - Mammogram every 1-2 years updated in Health Maintenance based on mutual decision making          9/30/2024   STI Screening   New sexual partner(s) since last STI/HIV test? No        History of abnormal Pap smear: No - age 30- 64 PAP with HPV every 5 years recommended        Latest Ref Rng & Units 7/18/2023     3:48 PM 7/22/2016     4:30 PM 7/22/2016    12:00 AM   PAP / HPV   PAP  Negative for Intraepithelial Lesion or Malignancy (NILM)      PAP (Historical)    NIL    HPV 16 DNA Negative Negative  Negative     HPV 18 DNA Negative Negative  Negative     Other HR HPV Negative Negative  Negative         ASCVD Risk   The 10-year ASCVD risk score (Nikky KEENAN, et al., 2019) is: 0.4%    Values used to calculate the score:      Age: 39 years      Sex: Female      Is Non- : No      Diabetic: No      Tobacco smoker: No      Systolic Blood Pressure: 119 mmHg      Is BP treated: No      HDL Cholesterol: 46 mg/dL      Total Cholesterol: 150 mg/dL          9/30/2024   Contraception/Family Planning   Questions about contraception or family planning No; takes BCP consistently.              Reviewed and updated as needed this visit by Provider   Tobacco  Allergies  Meds  Problems  Med Hx  Surg Hx  Fam Hx            Past Medical History:   Diagnosis Date    Acute reaction to stress 12/14/2006    Problem list name updated by automated process. Provider to review    H/O hyperthyroidism 04/16/2013    post partum     History of gestational diabetes 05/07/2018    Postpartum depression 06/23/2011       Past Surgical History:   Procedure Laterality Date    NO HISTORY OF SURGERY         Patient Active Problem List   Diagnosis    H/O hyperthyroidism    Overweight    Pre-diabetes     Past Surgical History:   Procedure Laterality Date    NO HISTORY OF SURGERY         Social History     Tobacco Use    Smoking status: Never    Smokeless tobacco: Never   Substance Use Topics  "   Alcohol use: No     Alcohol/week: 0.0 standard drinks of alcohol     Family History   Problem Relation Age of Onset    Diabetes Mother     Cardiovascular Mother         unknown     Thyroid Disease Mother     Diabetes Father     Family History Negative Sister     Family History Negative Brother     Diabetes Type 2  Maternal Grandmother     Eye Disorder Paternal Grandmother     Family History Negative Daughter     Family History Negative Son     Family History Negative Son            Current Outpatient Medications   Medication Sig Dispense Refill    famotidine (PEPCID) 20 MG tablet Take 1 tablet (20 mg) by mouth 2 times daily. 60 tablet 0    levonorgestrel-ethinyl estradiol (AVIANE) 0.1-20 MG-MCG tablet Take 1 tablet by mouth daily 84 tablet 4    multivitamin, therapeutic (THERA-VIT) TABS tablet Take 1 tablet by mouth daily         No Known Allergies      Review of Systems  CONSTITUTIONAL: NEGATIVE for fever, chills, change in weight  INTEGUMENTARY/SKIN: NEGATIVE for worrisome rashes, moles or lesions  EYES: NEGATIVE for vision changes or irritation  ENT/MOUTH: NEGATIVE for ear, mouth and throat problems  RESP: NEGATIVE for significant cough or SOB  BREAST: right breast mass.   CV: NEGATIVE for chest pain, palpitations or peripheral edema  GI: NEGATIVE for nausea or change in bowel habits.  Has epigastric pain.  H/O h pylori.  : NEGATIVE for frequency, dysuria, or hematuria  MUSCULOSKELETAL: NEGATIVE for significant arthralgias or myalgia  NEURO: NEGATIVE for weakness, dizziness or paresthesias  ENDOCRINE: NEGATIVE for temperature intolerance, skin/hair changes  HEME: NEGATIVE for bleeding problems  PSYCHIATRIC: NEGATIVE for changes in mood or affect       Objective    Exam  /79 (BP Location: Right arm, Patient Position: Sitting, Cuff Size: Adult Regular)   Pulse 88   Temp 98.5  F (36.9  C) (Oral)   Resp 19   Ht 1.59 m (5' 2.6\")   Wt 65.8 kg (145 lb)   LMP 08/25/2024 (Approximate)   SpO2 99%   BMI " "26.02 kg/m     Estimated body mass index is 26.02 kg/m  as calculated from the following:    Height as of this encounter: 1.59 m (5' 2.6\").    Weight as of this encounter: 65.8 kg (145 lb).    Physical Exam  Constitutional:       General: She is not in acute distress.     Appearance: Normal appearance. She is not ill-appearing.   HENT:      Head: Normocephalic.      Right Ear: Tympanic membrane, ear canal and external ear normal. There is no impacted cerumen.      Left Ear: Tympanic membrane, ear canal and external ear normal. There is no impacted cerumen.      Nose: Nose normal. No congestion or rhinorrhea.      Mouth/Throat:      Mouth: Mucous membranes are moist.      Pharynx: Oropharynx is clear. No oropharyngeal exudate or posterior oropharyngeal erythema.   Eyes:      General: No scleral icterus.        Right eye: No discharge.         Left eye: No discharge.      Extraocular Movements: Extraocular movements intact.      Conjunctiva/sclera: Conjunctivae normal.      Pupils: Pupils are equal, round, and reactive to light.   Neck:      Comments: Thyroid is normal shape and size and smooth.  No nodules or enlargement noted.  No pain noted.      Cardiovascular:      Rate and Rhythm: Normal rate and regular rhythm.      Pulses: Normal pulses.      Heart sounds: Normal heart sounds. No murmur heard.     No friction rub. No gallop.   Pulmonary:      Effort: Pulmonary effort is normal. No respiratory distress.      Breath sounds: Normal breath sounds. No stridor. No wheezing, rhonchi or rales.   Abdominal:      General: Abdomen is flat. Bowel sounds are normal. There is no distension.      Palpations: Abdomen is soft. There is no mass.      Tenderness: There is no abdominal tenderness. There is no right CVA tenderness, left CVA tenderness, guarding or rebound.      Hernia: No hernia is present.   Genitourinary:     General: Normal vulva.      Vagina: No vaginal discharge.      Comments: Breasts:  Breasts are symmetric " without dimpling.  Areolas are symmetric.  No discharge of nipples.  Small circular; 2-3 mm lump of the right breast of the areola; medial aspect.  No axillary lymphadenopathy.       External genitalia normal with no masses, lesions, or swelling.  Urethral meatus is intact; no erythema or discharge.  No swelling or discharge of Bartholin, urethral, or skene's glands.  Vaginal mucosa is pink and moist with normal ruggae.  No odor.  Scant amount of whitish discharge. Cervix is pink, closed, firm and midline.  Upon bimanual exam, cervix is smooth, firm and mobile.  No cervical motion tenderness. No adnexal tenderness or mass. Uterus is midline, smooth, non-tender, no irregularities and not enlarged.  Ovaries are not palpable.          Musculoskeletal:         General: No swelling, tenderness or deformity. Normal range of motion.      Cervical back: Normal range of motion and neck supple. No rigidity or tenderness.      Right lower leg: No edema.      Left lower leg: No edema.   Lymphadenopathy:      Cervical: No cervical adenopathy.   Skin:     General: Skin is warm and dry.      Capillary Refill: Capillary refill takes less than 2 seconds.      Coloration: Skin is not jaundiced.      Findings: No lesion or rash.   Neurological:      General: No focal deficit present.      Mental Status: She is alert and oriented to person, place, and time.      Cranial Nerves: No cranial nerve deficit.      Motor: No weakness.   Psychiatric:         Mood and Affect: Mood normal.         Behavior: Behavior normal.         Thought Content: Thought content normal.         Judgment: Judgment normal.           Signed Electronically by: SANJAY Damico CNP

## 2024-09-30 NOTE — LETTER
October 21, 2024      Keara Ayon  [address] [address] [facility] [address]        Dear ,    We are writing to inform you of your test results.    Your kidney, liver, and electrolytes are all normal.  Blood sugar was 136.    Your hemoglobin, white blood count, platelets, and other indices of the complete blood count are all normal / stable.    Your thyroid test is normal.  Your Hep C screen was negative.    Your H.Pylori test was negative.  No infection noted.  Your cholesterol panel was pretty good.  Continue with good exercise as this can help increase your good cholesterol (HDL).     Follow-up as discussed in clinic.     Call or send a [employer] message if any questions or concerns.    Resulted Orders   Lipid panel reflex to direct LDL Non-fasting   Result Value Ref Range    Cholesterol 150 <200 mg/dL    Triglycerides 126 <150 mg/dL    Direct Measure HDL 46 (L) >=50 mg/dL    LDL Cholesterol Calculated 79 <100 mg/dL    Non HDL Cholesterol 104 <130 mg/dL    Patient Fasting > 8hrs? Unknown     Narrative    Cholesterol  Desirable: < 200 mg/dL  Borderline High: 200 - 239 mg/dL  High: >= 240 mg/dL    Triglycerides  Normal: < 150 mg/dL  Borderline High: 150 - 199 mg/dL  High: 200-499 mg/dL  Very High: >= 500 mg/dL    Direct Measure HDL  Female: >= 50 mg/dL   Male: >= 40 mg/dL    LDL Cholesterol  Desirable: < 100 mg/dL  Above Desirable: 100 - 129 mg/dL   Borderline High: 130 - 159 mg/dL   High:  160 - 189 mg/dL   Very High: >= 190 mg/dL    Non HDL Cholesterol  Desirable: < 130 mg/dL  Above Desirable: 130 - 159 mg/dL  Borderline High: 160 - 189 mg/dL  High: 190 - 219 mg/dL  Very High: >= 220 mg/dL   CBC with platelets   Result Value Ref Range    WBC Count 6.5 4.0 - 11.0 10e3/uL    RBC Count 4.57 3.80 - 5.20 10e6/uL    Hemoglobin 13.4 11.7 - 15.7 g/dL    Hematocrit 40.3 35.0 - 47.0 %    MCV 88 78 - 100 fL    MCH 29.3 26.5 - 33.0 pg    MCHC 33.3 31.5 - 36.5 g/dL    RDW 13.1 10.0 - 15.0 %    Platelet Count 286 150  - 450 10e3/uL   Comprehensive metabolic panel   Result Value Ref Range    Sodium 141 135 - 145 mmol/L    Potassium 3.9 3.4 - 5.3 mmol/L    Carbon Dioxide (CO2) 23 22 - 29 mmol/L    Anion Gap 13 7 - 15 mmol/L    Urea Nitrogen 7.4 6.0 - 20.0 mg/dL    Creatinine 0.82 0.51 - 0.95 mg/dL    GFR Estimate >90 >60 mL/min/1.73m2      Comment:      eGFR calculated using 2021 CKD-EPI equation.    Calcium 9.3 8.8 - 10.4 mg/dL      Comment:      Reference intervals for this test were updated on 7/16/2024 to reflect our healthy population more accurately. There may be differences in the flagging of prior results with similar values performed with this method. Those prior results can be interpreted in the context of the updated reference intervals.    Chloride 105 98 - 107 mmol/L    Glucose 136 (H) 70 - 99 mg/dL    Alkaline Phosphatase 61 40 - 150 U/L    AST 28 0 - 45 U/L    ALT 25 0 - 50 U/L    Protein Total 7.3 6.4 - 8.3 g/dL    Albumin 4.3 3.5 - 5.2 g/dL    Bilirubin Total 0.2 <=1.2 mg/dL    Patient Fasting > 8hrs? Unknown        If you have any questions or concerns, please call the clinic at the number listed above.       Sincerely,      SANJAY Damico CNP

## 2024-10-01 LAB
ALBUMIN SERPL BCG-MCNC: 4.3 G/DL (ref 3.5–5.2)
ALP SERPL-CCNC: 61 U/L (ref 40–150)
ALT SERPL W P-5'-P-CCNC: 25 U/L (ref 0–50)
ANION GAP SERPL CALCULATED.3IONS-SCNC: 13 MMOL/L (ref 7–15)
AST SERPL W P-5'-P-CCNC: 28 U/L (ref 0–45)
BILIRUB SERPL-MCNC: 0.2 MG/DL
BUN SERPL-MCNC: 7.4 MG/DL (ref 6–20)
CALCIUM SERPL-MCNC: 9.3 MG/DL (ref 8.8–10.4)
CHLORIDE SERPL-SCNC: 105 MMOL/L (ref 98–107)
CHOLEST SERPL-MCNC: 150 MG/DL
CREAT SERPL-MCNC: 0.82 MG/DL (ref 0.51–0.95)
EGFRCR SERPLBLD CKD-EPI 2021: >90 ML/MIN/1.73M2
FASTING STATUS PATIENT QL REPORTED: ABNORMAL
FASTING STATUS PATIENT QL REPORTED: ABNORMAL
GLUCOSE SERPL-MCNC: 136 MG/DL (ref 70–99)
HCO3 SERPL-SCNC: 23 MMOL/L (ref 22–29)
HCV AB SERPL QL IA: NONREACTIVE
HDLC SERPL-MCNC: 46 MG/DL
LDLC SERPL CALC-MCNC: 79 MG/DL
NONHDLC SERPL-MCNC: 104 MG/DL
POTASSIUM SERPL-SCNC: 3.9 MMOL/L (ref 3.4–5.3)
PROT SERPL-MCNC: 7.3 G/DL (ref 6.4–8.3)
SODIUM SERPL-SCNC: 141 MMOL/L (ref 135–145)
TRIGL SERPL-MCNC: 126 MG/DL
TSH SERPL DL<=0.005 MIU/L-ACNC: 1.36 UIU/ML (ref 0.3–4.2)

## 2024-10-02 LAB — UREA BREATH TEST QL: NEGATIVE

## 2024-10-04 ENCOUNTER — HOSPITAL ENCOUNTER (OUTPATIENT)
Dept: MAMMOGRAPHY | Facility: CLINIC | Age: 39
Discharge: HOME | End: 2024-10-04
Attending: NURSE PRACTITIONER
Payer: COMMERCIAL

## 2024-10-04 ENCOUNTER — HOSPITAL ENCOUNTER (OUTPATIENT)
Dept: ULTRASOUND IMAGING | Facility: CLINIC | Age: 39
Discharge: HOME | End: 2024-10-04
Attending: NURSE PRACTITIONER
Payer: COMMERCIAL

## 2024-10-04 DIAGNOSIS — N63.14 MASS OF LOWER INNER QUADRANT OF RIGHT BREAST: ICD-10-CM

## 2024-10-04 PROCEDURE — 77066 DX MAMMO INCL CAD BI: CPT

## 2024-10-04 PROCEDURE — 76642 ULTRASOUND BREAST LIMITED: CPT | Mod: RT

## 2024-10-04 NOTE — LETTER
Keara Ayon  [address] [address] [address]        October 4, 2024    Date of Exam: 10/4/2024    Dear Keara:    Thank you for your recent visit.     Breast Imaging Result: We are pleased to inform you that the results of your recent breast imaging show no evidence of malignancy (cancer).    Your breast tissue is dense:  Breast tissue can be either dense or not dense. Dense tissue makes it harder to find breast cancer on a mammogram and also raises the risk of developing breast cancer. Your breast tissue is dense. In some people with dense tissue, other imaging tests in addition to a mammogram may help find cancers. Talk to your healthcare provider about breast density, risks for breast cancer, and your individual situation.    If you are having any problems such as a lump or pain in your breast, please discuss this with your health care team if you haven't already. You may need more testing.    Breast Cancer Screening Recommendation: Routine yearly mammography beginning at age 40 or as discussed with your provider.    A report of your breast imaging results was sent to: Kate Dockery    Your breast imaging will become part of your medical file here at [facility] for at least 10 years. You are responsible for telling any new health care team or breast imaging site of the date and place of this exam.     We appreciate the opportunity to participate in your health care.    Sincerely,  Radha Coon MD  [facility]

## 2024-10-04 NOTE — LETTER
October 21, 2024      Keara Ayon  [address] [address] [address]        Dear ,    We are writing to inform you of your test results.    Your Mammogram was okay.  Your ultrasound showed a small inclusion cyst.    This is benign.    Recommend yearly screening.    Now, with that being said, If you would like to see someone in the Breast Clinic, we could do that.    Please let me know if you want to be referred to breast clinic.  I can place the order at that time.    Resulted Orders   US Breast Right Limited 1-3 Quadrants    Narrative    EXAM: [facility] DIAGNOSTIC BILATERAL W/ IAN, US BREAST RIGHT LIMITED 1-3  QUADRANTS, 10/4/2024 2:39 PM    HISTORY: Palpable lump in the right breast    COMPARISON: 7/6/2022    BREAST DENSITY: The breasts are heterogeneously dense, which may  obscure small masses.    FINDINGS: Diagnostic tomosynthesis with CAD shows nothing for  malignancy in either breast.     Targeted ultrasound shows a small benign epidermal inclusion cyst  corresponding to the palpable lump at 3:00 1 cm from the nipple. No  underlying abnormality.      Impression    IMPRESSION: BI-RADS CATEGORY: 2 - Benign.    RECOMMENDED FOLLOW-UP: Routine yearly mammography beginning at age 40  or as discussed with your provider.       WILL MOORE MD         SYSTEM ID:  [id number]       If you have any questions or concerns, please call the clinic at the number listed above.       Sincerely,      SANJAY Damico CNP

## 2024-10-21 NOTE — RESULT ENCOUNTER NOTE
Has not reviewed [employer] message.   Please send letter with my [employer] message.      Thank you,   SANJAY Damico CNP

## 2024-10-31 DIAGNOSIS — R10.13 EPIGASTRIC PAIN: ICD-10-CM

## 2024-11-01 RX ORDER — FAMOTIDINE 20 MG/1
20 TABLET, FILM COATED ORAL 2 TIMES DAILY
Qty: 60 TABLET | Refills: 0 | Status: SHIPPED | OUTPATIENT
Start: 2024-11-01

## 2024-12-17 PROBLEM — L72.0 EPIDERMAL INCLUSION CYST: Status: ACTIVE | Noted: 2024-12-17

## 2025-04-04 ENCOUNTER — MYC REFILL (OUTPATIENT)
Dept: PEDIATRICS | Facility: CLINIC | Age: 40
End: 2025-04-04
Payer: COMMERCIAL

## 2025-04-04 DIAGNOSIS — Z30.41 ENCOUNTER FOR SURVEILLANCE OF CONTRACEPTIVE PILLS: ICD-10-CM

## 2025-04-04 RX ORDER — TIMOLOL MALEATE 5 MG/ML
1 SOLUTION/ DROPS OPHTHALMIC DAILY
Qty: 84 TABLET | Refills: 4 | OUTPATIENT
Start: 2025-04-04

## 2025-04-04 NOTE — LETTER
April 9, 2025      Keara Ayon  [address] [address] [facility] [address]      Dear Keara,    We recently received a call from your pharmacy requesting a refill of your medication.    A review of your chart indicates that an appointment is required with your provider.  Please call the clinic to schedule your appointment.      Thank you,    [facility]        
N/A

## 2025-04-07 RX ORDER — LEVONORGESTREL/ETHIN.ESTRADIOL 0.1-0.02MG
1 TABLET ORAL DAILY
Qty: 84 TABLET | Refills: 0 | Status: SHIPPED | OUTPATIENT
Start: 2025-04-07

## 2025-07-30 ENCOUNTER — MYC REFILL (OUTPATIENT)
Dept: PEDIATRICS | Facility: CLINIC | Age: 40
End: 2025-07-30
Payer: COMMERCIAL

## 2025-07-30 DIAGNOSIS — Z30.41 ENCOUNTER FOR SURVEILLANCE OF CONTRACEPTIVE PILLS: ICD-10-CM

## 2025-07-31 RX ORDER — LEVONORGESTREL/ETHIN.ESTRADIOL 0.1-0.02MG
1 TABLET ORAL DAILY
Qty: 84 TABLET | Refills: 0 | Status: SHIPPED | OUTPATIENT
Start: 2025-07-31

## 2025-09-01 ENCOUNTER — PATIENT OUTREACH (OUTPATIENT)
Dept: CARE COORDINATION | Facility: CLINIC | Age: 40
End: 2025-09-01
Payer: COMMERCIAL